# Patient Record
Sex: MALE | Race: WHITE | Employment: FULL TIME | URBAN - METROPOLITAN AREA
[De-identification: names, ages, dates, MRNs, and addresses within clinical notes are randomized per-mention and may not be internally consistent; named-entity substitution may affect disease eponyms.]

---

## 2021-04-25 ENCOUNTER — HOSPITAL ENCOUNTER (EMERGENCY)
Age: 46
Discharge: HOME | End: 2021-04-25
Attending: FAMILY MEDICINE
Payer: COMMERCIAL

## 2021-04-25 VITALS
WEIGHT: 215 LBS | HEART RATE: 74 BPM | RESPIRATION RATE: 16 BRPM | TEMPERATURE: 97.1 F | SYSTOLIC BLOOD PRESSURE: 119 MMHG | OXYGEN SATURATION: 98 % | DIASTOLIC BLOOD PRESSURE: 77 MMHG

## 2021-04-25 DIAGNOSIS — K02.9 PAIN DUE TO DENTAL CARIES: Primary | ICD-10-CM

## 2021-04-25 PROCEDURE — 99283 EMERGENCY DEPT VISIT LOW MDM: CPT

## 2021-04-25 RX ORDER — TRAMADOL HYDROCHLORIDE 50 MG/1
50 TABLET ORAL EVERY 8 HOURS PRN
Qty: 9 TABLET | Refills: 0 | Status: SHIPPED | OUTPATIENT
Start: 2021-04-25 | End: 2021-04-28

## 2021-04-25 RX ORDER — AMOXICILLIN 500 MG/1
500 CAPSULE ORAL 3 TIMES DAILY
Qty: 30 CAPSULE | Refills: 0 | Status: SHIPPED | OUTPATIENT
Start: 2021-04-25 | End: 2021-05-05

## 2021-04-25 RX ORDER — IBUPROFEN 800 MG/1
800 TABLET ORAL EVERY 6 HOURS PRN
Qty: 20 TABLET | Refills: 0 | Status: SHIPPED | OUTPATIENT
Start: 2021-04-25 | End: 2021-06-16

## 2021-04-25 RX ORDER — EMPAGLIFLOZIN 10 MG/1
10 TABLET, FILM COATED ORAL DAILY
COMMUNITY

## 2021-04-25 ASSESSMENT — PAIN DESCRIPTION - ORIENTATION: ORIENTATION: LEFT;UPPER

## 2021-04-25 ASSESSMENT — PAIN DESCRIPTION - LOCATION: LOCATION: JAW

## 2021-04-25 ASSESSMENT — PAIN DESCRIPTION - PAIN TYPE: TYPE: ACUTE PAIN

## 2021-04-25 ASSESSMENT — PAIN DESCRIPTION - PROGRESSION: CLINICAL_PROGRESSION: NOT CHANGED

## 2021-04-25 ASSESSMENT — PAIN DESCRIPTION - DESCRIPTORS: DESCRIPTORS: THROBBING

## 2021-04-25 ASSESSMENT — PAIN SCALES - GENERAL: PAINLEVEL_OUTOF10: 7

## 2021-04-25 NOTE — ED PROVIDER NOTES
HPI:  4/25/21,   Time: 2:28 PM EDT         Miguel Marquez is a 39 y.o. male presenting to the ED for a 2-day history of left upper dental pain and swelling in the left side of his face. The pain is an aching type pain of moderate intensity, worse with chewing. The pain is in the tooth has had problems with before. ROS:   Pertinent positives and negatives are stated within HPI, all other systems reviewed and are negative.  --------------------------------------------- PAST HISTORY ---------------------------------------------  Past Medical History:  has a past medical history of Diabetes mellitus ([facility] [address] Past Surgical History:  has a past surgical history that includes Appendectomy. Social History:  reports that he has quit smoking. He does not have any smokeless tobacco history on file. He reports previous alcohol use. He reports that he does not use drugs. Family History: family history is not on file. The patients home medications have been reviewed. Allergies: Patient has no known allergies. -------------------------------------------------- RESULTS -------------------------------------------------  All laboratory and radiology results have been personally reviewed by myself   LABS:  No results found for this visit on 04/25/21. RADIOLOGY:  Interpreted by Radiologist.  No orders to display       ------------------------- NURSING NOTES AND VITALS REVIEWED ---------------------------   The nursing notes within the ED encounter and vital signs as below have been reviewed.    /77   Pulse 74   Temp 97.1 °F (36.2 °C) (Temporal)   Resp 16   Wt 215 lb (97.5 kg)   SpO2 98%   Oxygen Saturation Interpretation: Normal      ---------------------------------------------------PHYSICAL EXAM--------------------------------------    Constitutional/General: Alert and oriented x3, well appearing, non toxic in NAD  Head: NC/AT  Eyes: PERRL, EOMI  Mouth: Oropharynx clear, handling secretions, no trismus; tooth #6 is broken and decayed down to the gumline. The gingiva appears normal.  Neck: Supple, full ROM, no meningeal signs  Pulmonary: Lungs clear to auscultation bilaterally, no wheezes, rales, or rhonchi. Not in respiratory distress  Cardiovascular:  Regular rate and rhythm, no murmurs, gallops, or rubs. 2+ distal pulses  Abdomen: Soft, non tender, non distended,   Extremities: Moves all extremities x 4. Warm and well perfused  Skin: warm and dry without rash  Neurologic: GCS 15,  Psych: Normal Affect      ------------------------------ ED COURSE/MEDICAL DECISION MAKING----------------------  Medications - No data to display      Medical Decision Making:    Simple    Counseling: The emergency provider has spoken with the patient and discussed todays results, in addition to providing specific details for the plan of care and counseling regarding the diagnosis and prognosis. Questions are answered at this time and they are agreeable with the plan.      --------------------------------- IMPRESSION AND DISPOSITION ---------------------------------    IMPRESSION  1.  Pain due to dental caries        DISPOSITION  Disposition: Discharge to home  Patient condition is stable                 Alex Mann MD  04/25/21 7102

## 2021-06-16 ENCOUNTER — HOSPITAL ENCOUNTER (EMERGENCY)
Age: 46
Discharge: HOME | End: 2021-06-16
Attending: EMERGENCY MEDICINE
Payer: COMMERCIAL

## 2021-06-16 VITALS
SYSTOLIC BLOOD PRESSURE: 140 MMHG | RESPIRATION RATE: 16 BRPM | DIASTOLIC BLOOD PRESSURE: 92 MMHG | OXYGEN SATURATION: 99 % | BODY MASS INDEX: 32.58 KG/M2 | HEART RATE: 81 BPM | WEIGHT: 220 LBS | TEMPERATURE: 97.1 F | HEIGHT: 69 IN

## 2021-06-16 DIAGNOSIS — K04.7 DENTAL INFECTION: Primary | ICD-10-CM

## 2021-06-16 PROCEDURE — 99282 EMERGENCY DEPT VISIT SF MDM: CPT

## 2021-06-16 RX ORDER — CLINDAMYCIN HYDROCHLORIDE 150 MG/1
150 CAPSULE ORAL 4 TIMES DAILY
Qty: 40 CAPSULE | Refills: 0 | Status: SHIPPED | OUTPATIENT
Start: 2021-06-16 | End: 2021-06-26

## 2021-06-16 RX ORDER — IBUPROFEN 600 MG/1
600 TABLET ORAL EVERY 8 HOURS PRN
Qty: 30 TABLET | Refills: 0 | Status: SHIPPED | OUTPATIENT
Start: 2021-06-16 | End: 2021-06-26

## 2021-06-16 ASSESSMENT — ENCOUNTER SYMPTOMS
EYE REDNESS: 0
SORE THROAT: 0
NAUSEA: 0
DIARRHEA: 0
BACK PAIN: 0
ABDOMINAL PAIN: 0
SHORTNESS OF BREATH: 0
COUGH: 0
SINUS PRESSURE: 0
VOMITING: 0
EYE DISCHARGE: 0
EYE PAIN: 0
WHEEZING: 0

## 2021-06-16 ASSESSMENT — PAIN SCALES - GENERAL: PAINLEVEL_OUTOF10: 7

## 2021-06-16 ASSESSMENT — PAIN DESCRIPTION - PAIN TYPE: TYPE: ACUTE PAIN

## 2021-06-16 ASSESSMENT — PAIN DESCRIPTION - ONSET: ONSET: SUDDEN

## 2021-06-16 ASSESSMENT — PAIN DESCRIPTION - FREQUENCY: FREQUENCY: CONTINUOUS

## 2021-06-16 ASSESSMENT — PAIN DESCRIPTION - LOCATION: LOCATION: TOE (COMMENT WHICH ONE)

## 2021-06-16 ASSESSMENT — PAIN DESCRIPTION - DESCRIPTORS: DESCRIPTORS: CONSTANT;THROBBING

## 2021-06-16 ASSESSMENT — PAIN DESCRIPTION - ORIENTATION: ORIENTATION: LEFT;UPPER

## 2021-06-16 ASSESSMENT — PAIN DESCRIPTION - PROGRESSION: CLINICAL_PROGRESSION: GRADUALLY WORSENING

## 2021-06-16 NOTE — ED PROVIDER NOTES
The history is provided by the patient. Dental Pain  Location:  Upper  Upper teeth location:  10/RADHA lateral incisor  Quality:  Aching  Severity:  Moderate  Onset quality:  Gradual  Duration:  1 week  Chronicity:  Recurrent  Context: abscess and dental caries    Associated symptoms: no fever and no headaches         Review of Systems   Constitutional: Negative for chills and fever. HENT: Negative for ear pain, sinus pressure and sore throat. Eyes: Negative for pain, discharge and redness. Respiratory: Negative for cough, shortness of breath and wheezing. Cardiovascular: Negative for chest pain. Gastrointestinal: Negative for abdominal pain, diarrhea, nausea and vomiting. Genitourinary: Negative for dysuria and frequency. Musculoskeletal: Negative for arthralgias and back pain. Skin: Negative for rash and wound. Neurological: Negative for weakness and headaches. Hematological: Negative for adenopathy. All other systems reviewed and are negative. Physical Exam  Vitals and nursing note reviewed. Constitutional:       Appearance: He is well-developed. HENT:      Head: Normocephalic and atraumatic. Jaw: No trismus. Right Ear: Hearing, tympanic membrane and external ear normal.      Left Ear: Hearing, tympanic membrane and external ear normal.      Nose: Nose normal.      Right Sinus: No maxillary sinus tenderness or frontal sinus tenderness. Left Sinus: No maxillary sinus tenderness or frontal sinus tenderness. Mouth/Throat:      Dentition: Gingival swelling and dental caries present. Pharynx: Uvula midline. No uvula swelling. Eyes:      General: Lids are normal.      Conjunctiva/sclera: Conjunctivae normal.      Pupils: Pupils are equal, round, and reactive to light. Cardiovascular:      Rate and Rhythm: Normal rate and regular rhythm. Heart sounds: Normal heart sounds. No murmur heard.      Pulmonary:      Effort: Pulmonary effort is normal. No respiratory distress. Breath sounds: Normal breath sounds. No wheezing or rales. Abdominal:      General: Bowel sounds are normal.      Palpations: Abdomen is soft. Abdomen is not rigid. Tenderness: There is no abdominal tenderness. There is no guarding or rebound. Musculoskeletal:      Cervical back: Normal range of motion and neck supple. Skin:     General: Skin is warm and dry. Findings: No abrasion or rash. Neurological:      Mental Status: He is alert and oriented to person, place, and time. GCS: GCS eye subscore is 4. GCS verbal subscore is 5. GCS motor subscore is 6. Cranial Nerves: No cranial nerve deficit. Sensory: No sensory deficit. Coordination: Coordination normal.      Gait: Gait normal.          Procedures     MDM          --------------------------------------------- PAST HISTORY ---------------------------------------------  Past Medical History:  has a past medical history of Diabetes mellitus ([facility] [address] Past Surgical History:  has a past surgical history that includes Appendectomy. Social History:  reports that he has quit smoking. He has never used smokeless tobacco. He reports previous alcohol use. He reports that he does not use drugs. Family History: family history is not on file. The patients home medications have been reviewed. Allergies: Patient has no known allergies. -------------------------------------------------- RESULTS -------------------------------------------------  Labs:  No results found for this visit on 06/16/21. Radiology:  No orders to display       ------------------------- NURSING NOTES AND VITALS REVIEWED ---------------------------  Date / Time Roomed:  6/16/2021  6:09 PM  ED Bed Assignment:  03/03    The nursing notes within the ED encounter and vital signs as below have been reviewed.    BP (!) 140/92   Pulse 81   Temp 97.1 °F (36.2 °C) (Temporal)   Resp 16   Ht 5' 9\" (1.753 m)   Wt 220 lb (99.8 kg) SpO2 99%   BMI 32.49 kg/m²   Oxygen Saturation Interpretation: Normal      ------------------------------------------ PROGRESS NOTES ------------------------------------------  I have spoken with the patient and discussed todays results, in addition to providing specific details for the plan of care and counseling regarding the diagnosis and prognosis. Their questions are answered at this time and they are agreeable with the plan. I discussed at length with them reasons for immediate return here for re evaluation. They will followup with primary care by calling their office tomorrow. --------------------------------- ADDITIONAL PROVIDER NOTES ---------------------------------  At this time the patient is without objective evidence of an acute process requiring hospitalization or inpatient management. They have remained hemodynamically stable throughout their entire ED visit and are stable for discharge with outpatient follow-up. The plan has been discussed in detail and they are aware of the specific conditions for emergent return, as well as the importance of follow-up. New Prescriptions    CLINDAMYCIN (CLEOCIN) 150 MG CAPSULE    Take 1 capsule by mouth 4 times daily for 10 days    IBUPROFEN (IBU) 600 MG TABLET    Take 1 tablet by mouth every 8 hours as needed for Pain       Diagnosis:  1. Dental infection        Disposition:  Patient's disposition: Discharge to home  Patient's condition is stable.                       Luca Lerner MD  06/16/21 4414

## 2022-11-30 ENCOUNTER — HOSPITAL ENCOUNTER (EMERGENCY)
Age: 47
Discharge: HOME | End: 2022-11-30

## 2022-11-30 ENCOUNTER — APPOINTMENT (OUTPATIENT)
Dept: CT IMAGING | Age: 47
End: 2022-11-30

## 2022-11-30 VITALS
SYSTOLIC BLOOD PRESSURE: 140 MMHG | HEART RATE: 80 BPM | DIASTOLIC BLOOD PRESSURE: 95 MMHG | OXYGEN SATURATION: 100 % | RESPIRATION RATE: 20 BRPM | TEMPERATURE: 98.2 F

## 2022-11-30 DIAGNOSIS — K57.32 DIVERTICULITIS OF COLON: Primary | ICD-10-CM

## 2022-11-30 LAB
ALBUMIN SERPL-MCNC: 4.2 G/DL (ref 3.5–5.2)
ALP BLD-CCNC: 96 U/L (ref 40–129)
ALT SERPL-CCNC: 32 U/L (ref 0–40)
ANION GAP SERPL CALCULATED.3IONS-SCNC: 10 MMOL/L (ref 7–16)
AST SERPL-CCNC: 18 U/L (ref 0–39)
BASOPHILS ABSOLUTE: 0.08 E9/L (ref 0–0.2)
BASOPHILS RELATIVE PERCENT: 0.6 % (ref 0–2)
BILIRUB SERPL-MCNC: 0.9 MG/DL (ref 0–1.2)
BUN BLDV-MCNC: 12 MG/DL (ref 6–20)
CALCIUM SERPL-MCNC: 9.3 MG/DL (ref 8.6–10.2)
CHLORIDE BLD-SCNC: 95 MMOL/L (ref 98–107)
CO2: 29 MMOL/L (ref 22–29)
CREAT SERPL-MCNC: 1 MG/DL (ref 0.7–1.2)
EOSINOPHILS ABSOLUTE: 0.47 E9/L (ref 0.05–0.5)
EOSINOPHILS RELATIVE PERCENT: 3.5 % (ref 0–6)
GFR SERPL CREATININE-BSD FRML MDRD: >60 ML/MIN/1.73
GLUCOSE BLD-MCNC: 225 MG/DL (ref 74–99)
HCT VFR BLD CALC: 43.6 % (ref 37–54)
HEMOGLOBIN: 14.9 G/DL (ref 12.5–16.5)
IMMATURE GRANULOCYTES #: 0.15 E9/L
IMMATURE GRANULOCYTES %: 1.1 % (ref 0–5)
LACTIC ACID: 1.4 MMOL/L (ref 0.5–2.2)
LYMPHOCYTES ABSOLUTE: 2.41 E9/L (ref 1.5–4)
LYMPHOCYTES RELATIVE PERCENT: 17.7 % (ref 20–42)
MCH RBC QN AUTO: 30.7 PG (ref 26–35)
MCHC RBC AUTO-ENTMCNC: 34.2 % (ref 32–34.5)
MCV RBC AUTO: 89.7 FL (ref 80–99.9)
MONOCYTES ABSOLUTE: 1.29 E9/L (ref 0.1–0.95)
MONOCYTES RELATIVE PERCENT: 9.5 % (ref 2–12)
NEUTROPHILS ABSOLUTE: 9.21 E9/L (ref 1.8–7.3)
NEUTROPHILS RELATIVE PERCENT: 67.6 % (ref 43–80)
PDW BLD-RTO: 12.3 FL (ref 11.5–15)
PLATELET # BLD: 250 E9/L (ref 130–450)
PMV BLD AUTO: 10.8 FL (ref 7–12)
POTASSIUM REFLEX MAGNESIUM: 4.4 MMOL/L (ref 3.5–5)
RBC # BLD: 4.86 E12/L (ref 3.8–5.8)
SODIUM BLD-SCNC: 134 MMOL/L (ref 132–146)
TOTAL PROTEIN: 6.9 G/DL (ref 6.4–8.3)
WBC # BLD: 13.6 E9/L (ref 4.5–11.5)

## 2022-11-30 PROCEDURE — 80053 COMPREHEN METABOLIC PANEL: CPT

## 2022-11-30 PROCEDURE — 6360000004 HC RX CONTRAST MEDICATION: Performed by: RADIOLOGY

## 2022-11-30 PROCEDURE — 96374 THER/PROPH/DIAG INJ IV PUSH: CPT

## 2022-11-30 PROCEDURE — 83605 ASSAY OF LACTIC ACID: CPT

## 2022-11-30 PROCEDURE — 6360000002 HC RX W HCPCS: Performed by: PHYSICIAN ASSISTANT

## 2022-11-30 PROCEDURE — 96375 TX/PRO/DX INJ NEW DRUG ADDON: CPT

## 2022-11-30 PROCEDURE — 6370000000 HC RX 637 (ALT 250 FOR IP): Performed by: PHYSICIAN ASSISTANT

## 2022-11-30 PROCEDURE — 85025 COMPLETE CBC W/AUTO DIFF WBC: CPT

## 2022-11-30 PROCEDURE — 74177 CT ABD & PELVIS W/CONTRAST: CPT

## 2022-11-30 PROCEDURE — 99285 EMERGENCY DEPT VISIT HI MDM: CPT

## 2022-11-30 RX ORDER — METRONIDAZOLE 500 MG/1
500 TABLET ORAL 4 TIMES DAILY
Qty: 40 TABLET | Refills: 0 | Status: SHIPPED | OUTPATIENT
Start: 2022-11-30 | End: 2022-12-10

## 2022-11-30 RX ORDER — HYDROCODONE BITARTRATE AND ACETAMINOPHEN 5; 325 MG/1; MG/1
1 TABLET ORAL EVERY 6 HOURS PRN
Qty: 12 TABLET | Refills: 0 | Status: SHIPPED | OUTPATIENT
Start: 2022-11-30 | End: 2022-12-03

## 2022-11-30 RX ORDER — METRONIDAZOLE 500 MG/1
500 TABLET ORAL ONCE
Status: COMPLETED | OUTPATIENT
Start: 2022-11-30 | End: 2022-11-30

## 2022-11-30 RX ORDER — CEFDINIR 300 MG/1
300 CAPSULE ORAL 2 TIMES DAILY
Qty: 20 CAPSULE | Refills: 0 | Status: SHIPPED | OUTPATIENT
Start: 2022-11-30 | End: 2022-12-10

## 2022-11-30 RX ORDER — CEFDINIR 300 MG/1
300 CAPSULE ORAL ONCE
Status: COMPLETED | OUTPATIENT
Start: 2022-11-30 | End: 2022-11-30

## 2022-11-30 RX ORDER — ONDANSETRON 2 MG/ML
4 INJECTION INTRAMUSCULAR; INTRAVENOUS ONCE
Status: COMPLETED | OUTPATIENT
Start: 2022-11-30 | End: 2022-11-30

## 2022-11-30 RX ORDER — KETOROLAC TROMETHAMINE 30 MG/ML
30 INJECTION, SOLUTION INTRAMUSCULAR; INTRAVENOUS ONCE
Status: COMPLETED | OUTPATIENT
Start: 2022-11-30 | End: 2022-11-30

## 2022-11-30 RX ADMIN — METRONIDAZOLE 500 MG: 500 TABLET ORAL at 15:40

## 2022-11-30 RX ADMIN — KETOROLAC TROMETHAMINE 30 MG: 30 INJECTION, SOLUTION INTRAMUSCULAR at 12:44

## 2022-11-30 RX ADMIN — ONDANSETRON 4 MG: 2 INJECTION INTRAMUSCULAR; INTRAVENOUS at 12:44

## 2022-11-30 RX ADMIN — CEFDINIR 300 MG: 300 CAPSULE ORAL at 15:40

## 2022-11-30 RX ADMIN — IOPAMIDOL 75 ML: 755 INJECTION, SOLUTION INTRAVENOUS at 14:17

## 2022-11-30 ASSESSMENT — PAIN SCALES - GENERAL
PAINLEVEL_OUTOF10: 4
PAINLEVEL_OUTOF10: 10

## 2022-11-30 ASSESSMENT — PAIN DESCRIPTION - DESCRIPTORS: DESCRIPTORS: ACHING

## 2022-11-30 ASSESSMENT — PAIN DESCRIPTION - ORIENTATION: ORIENTATION: LEFT

## 2022-11-30 ASSESSMENT — PAIN DESCRIPTION - LOCATION: LOCATION: ABDOMEN

## 2022-11-30 ASSESSMENT — PAIN - FUNCTIONAL ASSESSMENT: PAIN_FUNCTIONAL_ASSESSMENT: 0-10

## 2022-11-30 NOTE — ED PROVIDER NOTES
[facility]      HPI:  11/30/22, Time: 12:15 PM GLENDA Odonnell is a 55 y.o. male presenting to the ED for left lower quadrant pain, beginning 1 day ago. The complaint has been persistent, moderate in severity, and worsened by nothing. Patient comes in with complaint of left lower quadrant pain that started yesterday. Pain is sharp crampy pain. Does not radiate to the back. He denies any nausea vomiting diarrhea or constipation and he had some discomfort when urinating. Denies any known fever states he has had chills. No history of kidney stone or diverticulitis in the past.    Review of Systems:   A complete review of systems was performed and pertinent positives and negatives are stated within HPI, all other systems reviewed and are negative.          --------------------------------------------- PAST HISTORY ---------------------------------------------  Past Medical History:  has a past medical history of Diabetes mellitus ([facility] [address] Past Surgical History:  has a past surgical history that includes Appendectomy. Social History:  reports that he has quit smoking. He has never used smokeless tobacco. He reports that he does not currently use alcohol. He reports that he does not use drugs. Family History: family history is not on file. The patients home medications have been reviewed. Allergies: Patient has no known allergies.     -------------------------------------------------- RESULTS -------------------------------------------------  All laboratory and radiology results have been personally reviewed by myself   LABS:  Results for orders placed or performed during the hospital encounter of 11/30/22   CBC with Auto Differential   Result Value Ref Range    WBC 13.6 (H) 4.5 - 11.5 E9/L    RBC 4.86 3.80 - 5.80 E12/L    Hemoglobin 14.9 12.5 - 16.5 g/dL    Hematocrit 43.6 37.0 - 54.0 %    MCV 89.7 80.0 - 99.9 fL    MCH 30.7 26.0 - 35.0 pg    MCHC 34.2 32.0 - 34.5 %    RDW 12.3 11.5 - 15.0 fL    Platelets 602 676 - 510 E9/L    MPV 10.8 7.0 - 12.0 fL    Neutrophils % 67.6 43.0 - 80.0 %    Immature Granulocytes % 1.1 0.0 - 5.0 %    Lymphocytes % 17.7 (L) 20.0 - 42.0 %    Monocytes % 9.5 2.0 - 12.0 %    Eosinophils % 3.5 0.0 - 6.0 %    Basophils % 0.6 0.0 - 2.0 %    Neutrophils Absolute 9.21 (H) 1.80 - 7.30 E9/L    Immature Granulocytes # 0.15 E9/L    Lymphocytes Absolute 2.41 1.50 - 4.00 E9/L    Monocytes Absolute 1.29 (H) 0.10 - 0.95 E9/L    Eosinophils Absolute 0.47 0.05 - 0.50 E9/L    Basophils Absolute 0.08 0.00 - 0.20 E9/L   Comprehensive Metabolic Panel w/ Reflex to MG   Result Value Ref Range    Sodium 134 132 - 146 mmol/L    Potassium reflex Magnesium 4.4 3.5 - 5.0 mmol/L    Chloride 95 (L) 98 - 107 mmol/L    CO2 29 22 - 29 mmol/L    Anion Gap 10 7 - 16 mmol/L    Glucose 225 (H) 74 - 99 mg/dL    BUN 12 6 - 20 mg/dL    Creatinine 1.0 0.7 - 1.2 mg/dL    Est, Glom Filt Rate >60 >=60 mL/min/1.73    Calcium 9.3 8.6 - 10.2 mg/dL    Total Protein 6.9 6.4 - 8.3 g/dL    Albumin 4.2 3.5 - 5.2 g/dL    Total Bilirubin 0.9 0.0 - 1.2 mg/dL    Alkaline Phosphatase 96 40 - 129 U/L    ALT 32 0 - 40 U/L    AST 18 0 - 39 U/L   Lactic Acid   Result Value Ref Range    Lactic Acid 1.4 0.5 - 2.2 mmol/L       RADIOLOGY:  Interpreted by Radiologist.  CT ABDOMEN PELVIS W IV CONTRAST Additional Contrast? None   Final Result   1. ACUTE UNCOMPLICATED DIVERTICULITIS involving the proximal sigmoid colon. 2. Severe distal colonic diverticulosis. 3. Hepatomegaly with steatosis.             ------------------------- NURSING NOTES AND VITALS REVIEWED ---------------------------   The nursing notes within the ED encounter and vital signs as below have been reviewed.    BP (!) 140/95   Pulse 80   Temp 98.2 °F (36.8 °C)   Resp 20   SpO2 100%   Oxygen Saturation Interpretation: Normal      ---------------------------------------------------PHYSICAL EXAM--------------------------------------      Constitutional/General: Alert and oriented x3, well appearing, non toxic in NAD  Head: Normocephalic and atraumatic  Eyes: PERRL, EOMI  Mouth: Oropharynx clear, handling secretions, no trismus  Neck: Supple, full ROM,   Pulmonary: Lungs clear to auscultation bilaterally, no wheezes, rales, or rhonchi. Not in respiratory distress  Cardiovascular:  Regular rate and rhythm, no murmurs, gallops, or rubs. 2+ distal pulses  Abdomen: Soft, tender left lower quadrant non distended, no CVA tenderness  Extremities: Moves all extremities x 4. Warm and well perfused  Skin: warm and dry without rash  Neurologic: GCS 15,  Psych: Normal Affect      ------------------------------ ED COURSE/MEDICAL DECISION MAKING----------------------  Medications   ketorolac (TORADOL) injection 30 mg (30 mg IntraVENous Given 11/30/22 1244)   ondansetron (ZOFRAN) injection 4 mg (4 mg IntraVENous Given 11/30/22 1244)   iopamidol (ISOVUE-370) 76 % injection 75 mL (75 mLs IntraVENous Given 11/30/22 1417)   cefdinir (OMNICEF) capsule 300 mg (300 mg Oral Given 11/30/22 1540)   metroNIDAZOLE (FLAGYL) tablet 500 mg (500 mg Oral Given 11/30/22 1540)         ED COURSE:   1425 patient states his pain is controlled at this time he was updated on labs CT is pending    Medical Decision Making:    Patient Came in with complaint of left lower quadrant pain that started yesterday. CT showing a acute uncomplicated diverticulitis patient had control of pain with Toradol here in ER he was placed on Omnicef Flagyl and Norco.  He understands he is to return to the ER if he has any worsening symptoms development of fever persistent uncontrolled pain. Counseling: The emergency provider has spoken with the patient and discussed todays results, in addition to providing specific details for the plan of care and counseling regarding the diagnosis and prognosis.   Questions are answered at this time and they are agreeable with the plan.      --------------------------------- IMPRESSION AND DISPOSITION ---------------------------------    IMPRESSION  1. Diverticulitis of colon        DISPOSITION  Disposition: Discharge to home  Patient condition is good      NOTE: This report was transcribed using voice recognition software.  Every effort was made to ensure accuracy; however, inadvertent computerized transcription errors may be present      Minus Joyce, [id number]  11/30/22 4900

## 2022-11-30 NOTE — Clinical Note
Wolfgang Anaya was seen and treated in our emergency department on 11/30/2022. He may return to work on 12/05/2022. If you have any questions or concerns, please don't hesitate to call.       ADDY Caban

## 2023-04-11 ENCOUNTER — HOSPITAL ENCOUNTER (EMERGENCY)
Age: 48
Discharge: HOME | End: 2023-04-11
Attending: FAMILY MEDICINE
Payer: COMMERCIAL

## 2023-04-11 VITALS
HEIGHT: 69 IN | DIASTOLIC BLOOD PRESSURE: 98 MMHG | HEART RATE: 68 BPM | SYSTOLIC BLOOD PRESSURE: 132 MMHG | TEMPERATURE: 97.3 F | BODY MASS INDEX: 32.58 KG/M2 | WEIGHT: 220 LBS | OXYGEN SATURATION: 99 % | RESPIRATION RATE: 16 BRPM

## 2023-04-11 DIAGNOSIS — K02.9 PAIN DUE TO DENTAL CARIES: Primary | ICD-10-CM

## 2023-04-11 PROCEDURE — 99283 EMERGENCY DEPT VISIT LOW MDM: CPT

## 2023-04-11 RX ORDER — IBUPROFEN 800 MG/1
800 TABLET ORAL EVERY 8 HOURS PRN
Qty: 20 TABLET | Refills: 0 | Status: SHIPPED | OUTPATIENT
Start: 2023-04-11

## 2023-04-11 RX ORDER — AMOXICILLIN 500 MG/1
500 CAPSULE ORAL 3 TIMES DAILY
Qty: 30 CAPSULE | Refills: 0 | Status: SHIPPED | OUTPATIENT
Start: 2023-04-11 | End: 2023-04-21

## 2023-04-11 ASSESSMENT — PAIN DESCRIPTION - LOCATION: LOCATION: JAW;EAR

## 2023-04-11 ASSESSMENT — PAIN DESCRIPTION - PAIN TYPE: TYPE: ACUTE PAIN

## 2023-04-11 ASSESSMENT — PAIN DESCRIPTION - ORIENTATION: ORIENTATION: LEFT;LOWER

## 2023-04-11 ASSESSMENT — PAIN DESCRIPTION - FREQUENCY: FREQUENCY: CONTINUOUS

## 2023-04-11 ASSESSMENT — PAIN DESCRIPTION - DESCRIPTORS: DESCRIPTORS: THROBBING

## 2023-04-11 ASSESSMENT — PAIN SCALES - GENERAL: PAINLEVEL_OUTOF10: 6

## 2023-04-11 ASSESSMENT — PAIN - FUNCTIONAL ASSESSMENT: PAIN_FUNCTIONAL_ASSESSMENT: 0-10

## 2023-04-11 NOTE — ED PROVIDER NOTES
HPI:  4/11/23,   Time: 2:25 PM EDT         Kaylin Bishop is a 52 y.o. male presenting to the ED for 1 day history of left lower jaw pain, worse with chewing, also associated with some sharp left ear pain that is intermittent. He denies nasal congestion or nasal discharge. He denies fever chills or body aches. ROS:   Pertinent positives and negatives are stated within HPI, all other systems reviewed and are negative.  --------------------------------------------- PAST HISTORY ---------------------------------------------  Past Medical History:  has a past medical history of Diabetes mellitus ([facility] [address] Past Surgical History:  has a past surgical history that includes Appendectomy. Social History:  reports that he has quit smoking. He has never used smokeless tobacco. He reports that he does not currently use alcohol. He reports that he does not use drugs. Family History: family history is not on file. The patients home medications have been reviewed. Allergies: Patient has no known allergies. -------------------------------------------------- RESULTS -------------------------------------------------  All laboratory and radiology results have been personally reviewed by myself   LABS:  No results found for this visit on 04/11/23. RADIOLOGY:  Interpreted by Radiologist.  No orders to display       ------------------------- NURSING NOTES AND VITALS REVIEWED ---------------------------   The nursing notes within the ED encounter and vital signs as below have been reviewed.    BP (!) 132/98   Pulse 68   Temp 97.3 °F (36.3 °C) (Temporal)   Resp 16   Ht 5' 9\" (1.753 m)   Wt 220 lb (99.8 kg)   SpO2 99%   BMI 32.49 kg/m²   Oxygen Saturation Interpretation: Normal      ---------------------------------------------------PHYSICAL EXAM--------------------------------------    Constitutional/General: Alert and oriented x3, well appearing, non toxic in NAD  Head: NC/AT  Eyes: PERRL,

## 2023-08-07 ENCOUNTER — EVALUATION (OUTPATIENT)
Dept: OCCUPATIONAL THERAPY | Age: 48
End: 2023-08-07
Payer: COMMERCIAL

## 2023-08-07 DIAGNOSIS — M20.029 BOUTONNIERE DEFORMITY, UNSPECIFIED LATERALITY: Primary | ICD-10-CM

## 2023-08-07 PROCEDURE — 97165 OT EVAL LOW COMPLEX 30 MIN: CPT | Performed by: OCCUPATIONAL THERAPIST

## 2023-08-07 PROCEDURE — 97530 THERAPEUTIC ACTIVITIES: CPT | Performed by: OCCUPATIONAL THERAPIST

## 2023-08-07 PROCEDURE — 97760 ORTHOTIC MGMT&TRAING 1ST ENC: CPT | Performed by: OCCUPATIONAL THERAPIST

## 2023-08-07 NOTE — PROGRESS NOTES
difficulty bending the tip of the finger. Pt presents today for OT evaluation and treatment. Home Living: Pt lives at home with his wife and kids. Prior Level of Function: Independent    Cognition:   Alert/Oriented x3    IADL STATUS: Increased soreness with extended or more resistive use of the hand but otherwise IND in all tasks. Pulling clutch of his motorcycle can increase pain in the digit. 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 Reports no difficulty. ADL STATUS: IND in all with no complaints.       Pain Level: Typically at rest or with light use - digit is 0-1/10 pain, can range between 2-5/10 pain in the PIP of the MF with proglonged grasping of flexion activities - of with max extension stretch of the PIP;  aching, dull, tight (pulling), and uncomfortable    UE Assessment:   Pt will benefit from continued skilled OT services to further address pain/edema management, boutonniere deformity/MF ROM, and activity tolerance with functional, resistive use of the L UE s/p injury.     -Pt presenting with clear boutonniere deformity of the MF with some bruising laterally  -Full fist with full opposition observed (mild difficulty to MF - unable to flex DIP)  -Stiffness in PIP and DIP observed in MF when positioned into hook fist  -PIP of MF can be extended near full passively w/ moderate pain reported  -Mild tenderness to palpation laterally of the PIP joint  -Pt able to flex DIP while blocked and in composite fist  -Good prehension skills observed with slight to mild difficulty with in-hand manipulation tasks  -Fair activity tolerance observed with moderate to heavy resistive tasks (reports pain anywhere from 2-5/10, the longer the activity results in increased pain)       Left Upper Extremity ROM /  KEY: Ext/Flex   AROM(PROM)              IE  3rd Digit MCP E/F: 0/90*-100* 79*      PIP E/F: 0/100* -22*/100*      DIP E/F: 0/70*-90* 15* HE/37*       Comment: Hand Dominance is

## 2023-08-10 ENCOUNTER — TREATMENT (OUTPATIENT)
Dept: OCCUPATIONAL THERAPY | Age: 48
End: 2023-08-10
Payer: COMMERCIAL

## 2023-08-10 DIAGNOSIS — M20.029 BOUTONNIERE DEFORMITY, UNSPECIFIED LATERALITY: Primary | ICD-10-CM

## 2023-08-10 PROCEDURE — 97110 THERAPEUTIC EXERCISES: CPT | Performed by: OCCUPATIONAL THERAPIST

## 2023-08-10 PROCEDURE — 97140 MANUAL THERAPY 1/> REGIONS: CPT | Performed by: OCCUPATIONAL THERAPIST

## 2023-08-10 PROCEDURE — 97530 THERAPEUTIC ACTIVITIES: CPT | Performed by: OCCUPATIONAL THERAPIST

## 2023-08-10 PROCEDURE — 97018 PARAFFIN BATH THERAPY: CPT | Performed by: OCCUPATIONAL THERAPIST

## 2023-08-10 NOTE — PROGRESS NOTES
OCCUPATIONAL THERAPY DAILY NOTE  [facility] THERAPY   [place] [address] [address]  Dept: [phone]  Loc: [phone]   Janice Choudhury OT Fax: [phone]      Date:  8/10/2023  Initial Evaluation Date: 23     Evaluating Therapist: Clotilde Plascencia OT    Patient Name:  Jim Barrientos. 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 Eboni Natarajan DO  Specific Practitioner Orders: LMB splinting of the MF until full extension is obtained, then figure-of-eight splinting. 6-8 weeks w/ ROM of the DIP joint. PROM, AAROM, AROM, stretching, strengthening, and modalities as needed. Splint needed: LMB then figure 8. Assessment of current deficits   [x] ROM           [x] Pain            [x] Activity tolerance   [x] Edema             OT PLAN OF CARE   OT POC based on physician orders, patient diagnosis and results of clinical assessment.      Frequency/Duration: 1-2x/wk for 12 visits  /  Certification Period From: 2023  To: 2023     Specific OT Treatment to include:   [x] Instruction in HEP                   Modalities:  [x] Therapeutic Exercise                 [x] Ultrasound             [x] PROM/Stretching                    [x] Fluidotherapy          [x]  Paraffin                   [x] AAROM  [x] AROM                 [x] Iontophoresis:   [x] Tendon Glides                         [x] Electrical Stimulation/Attended                    [x] ADL/IADL re-training                 [x] Desensitization/Nerve Stimulation     [x] Therapeutic Activity                  [x] Pain Management with/without modalities PRN  [x] Manual Therapy

## 2023-08-14 ENCOUNTER — TREATMENT (OUTPATIENT)
Dept: OCCUPATIONAL THERAPY | Age: 48
End: 2023-08-14
Payer: COMMERCIAL

## 2023-08-14 DIAGNOSIS — M20.029 BOUTONNIERE DEFORMITY, UNSPECIFIED LATERALITY: Primary | ICD-10-CM

## 2023-08-14 PROCEDURE — 97018 PARAFFIN BATH THERAPY: CPT | Performed by: OCCUPATIONAL THERAPIST

## 2023-08-14 PROCEDURE — 97140 MANUAL THERAPY 1/> REGIONS: CPT | Performed by: OCCUPATIONAL THERAPIST

## 2023-08-14 PROCEDURE — 97110 THERAPEUTIC EXERCISES: CPT | Performed by: OCCUPATIONAL THERAPIST

## 2023-08-14 PROCEDURE — 97530 THERAPEUTIC ACTIVITIES: CPT | Performed by: OCCUPATIONAL THERAPIST

## 2023-08-14 NOTE — PROGRESS NOTES
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 Jacquelin Wilkins DO  Specific Practitioner Orders: LMB splinting of the MF until full extension is obtained, then figure-of-eight splinting. 6-8 weeks w/ ROM of the DIP joint. PROM, AAROM, AROM, stretching, strengthening, and modalities as needed. Splint needed: LMB then figure 8. Assessment of current deficits   [x] ROM           [x] Pain            [x] Activity tolerance   [x] Edema             OT PLAN OF CARE   OT POC based on physician orders, patient diagnosis and results of clinical assessment.      Frequency/Duration: 1-2x/wk for 12 visits  /  Certification Period From: 2023  To: 2023     Specific OT Treatment to include:   [x] Instruction in HEP                   Modalities:  [x] Therapeutic Exercise                 [x] Ultrasound             [x] PROM/Stretching                    [x] Fluidotherapy          [x]  Paraffin                   [x] AAROM  [x] AROM                 [x] Iontophoresis:   [x] Tendon Glides                         [x] Electrical Stimulation/Attended                    [x] ADL/IADL re-training                 [x] Desensitization/Nerve Stimulation     [x] Therapeutic Activity                  [x] Pain Management with/without modalities PRN  [x] Manual

## 2023-08-17 ENCOUNTER — TREATMENT (OUTPATIENT)
Dept: OCCUPATIONAL THERAPY | Age: 48
End: 2023-08-17

## 2023-08-17 DIAGNOSIS — M20.029 BOUTONNIERE DEFORMITY, UNSPECIFIED LATERALITY: Primary | ICD-10-CM

## 2023-08-17 NOTE — PROGRESS NOTES
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 Ceasar Kent DO  Specific Practitioner Orders: LMB splinting of the MF until full extension is obtained, then figure-of-eight splinting. 6-8 weeks w/ ROM of the DIP joint. PROM, AAROM, AROM, stretching, strengthening, and modalities as needed. Splint needed: LMB then figure 8. Assessment of current deficits   [x] ROM           [x] Pain            [x] Activity tolerance   [x] Edema             OT PLAN OF CARE   OT POC based on physician orders, patient diagnosis and results of clinical assessment.      Frequency/Duration: 1-2x/wk for 12 visits  /  Certification Period From: 2023  To: 2023     Specific OT Treatment to include:   [x] Instruction in HEP                   Modalities:  [x] Therapeutic Exercise                 [x] Ultrasound             [x] PROM/Stretching                    [x] Fluidotherapy          [x]  Paraffin                   [x] AAROM  [x] AROM                 [x] Iontophoresis:   [x] Tendon Glides                         [x] Electrical Stimulation/Attended                    [x] ADL/IADL re-training                 [x] Desensitization/Nerve Stimulation     [x] Therapeutic Activity                  [x] Pain Management with/without modalities PRN  [x] Manual

## 2023-08-21 ENCOUNTER — TREATMENT (OUTPATIENT)
Dept: OCCUPATIONAL THERAPY | Age: 48
End: 2023-08-21
Payer: COMMERCIAL

## 2023-08-21 DIAGNOSIS — M20.029 BOUTONNIERE DEFORMITY, UNSPECIFIED LATERALITY: Primary | ICD-10-CM

## 2023-08-21 PROCEDURE — 97140 MANUAL THERAPY 1/> REGIONS: CPT | Performed by: OCCUPATIONAL THERAPY ASSISTANT

## 2023-08-21 PROCEDURE — 97110 THERAPEUTIC EXERCISES: CPT | Performed by: OCCUPATIONAL THERAPY ASSISTANT

## 2023-08-21 PROCEDURE — 97530 THERAPEUTIC ACTIVITIES: CPT | Performed by: OCCUPATIONAL THERAPY ASSISTANT

## 2023-08-21 PROCEDURE — 97018 PARAFFIN BATH THERAPY: CPT | Performed by: OCCUPATIONAL THERAPY ASSISTANT

## 2023-08-21 NOTE — PROGRESS NOTES
Therapy                      [x] Splinting                                   [x] Scar Management                   [x] Joint Mobilization                                        [x] Manual Edema Mobilization    [x] Myofascial Release                   [x] GM/FM Coordination                [x] Safety retraining/education per individual diagnosis/goals                                      Patient Specific Goal: To position finger back to normal extension                             GOALS (Long term same as Short term):  1) Patient will demonstrate good understanding of home program (exercises/activities/diagnosis/prognosis/goals) with % compliance. 2) Patient will demonstrate increased active/passive range of motion of their PIP ext to -18* or less. 3) Patient will demonstrate increased active/passive range of motion of their DIP to 5* or less HE and 50* or more of flexion to reduce digital deformity. 4) Patient to report decreased pain in their affected L upper extremity from 2-5/10 to 2/10 or less with resistive functional use. 5) Patient to report 100% compliance with their splint wear, care, and precautions if needed. 6) Patient will demonstrate improved functional activity tolerance from fair to good for IADL completion and riding motorcycle. TODAY'S TREATMENT     Pain Level: 1/10 when patient arrived and increases up to 3/10 soreness with resistive use; 2-3/10 walking into today's session. Subjective: Pt stated \"When I took my splint off this morning. I couldn't believe how flat my finger was\"      Objective:  Updated POC to be completed by visit 6.   INTERVENTION: COMPLETED: SPECIFICS/COMMENTS:   Modality:     Paraffin Tx x - 10 min to decrease joint stiffness and promote mobility to soft tissues        AROM/AAROM:     Hand ROM ex X  X    X  X  X  X  X  X  X  X  x - isolated blocking  PIP (1 set) , DIP flex/ ext- 2x15 ea  - Fingertip curls around brown dowel -> advanced to pencil x

## 2023-08-25 ENCOUNTER — TREATMENT (OUTPATIENT)
Dept: OCCUPATIONAL THERAPY | Age: 48
End: 2023-08-25

## 2023-08-25 DIAGNOSIS — M20.029 BOUTONNIERE DEFORMITY, UNSPECIFIED LATERALITY: Primary | ICD-10-CM

## 2023-08-25 NOTE — PROGRESS NOTES
Therapy                      [x] Splinting                                   [x] Scar Management                   [x] Joint Mobilization                                        [x] Manual Edema Mobilization    [x] Myofascial Release                   [x] GM/FM Coordination                [x] Safety retraining/education per individual diagnosis/goals                                      Patient Specific Goal: To position finger back to normal extension                             GOALS (Long term same as Short term):  1) Patient will demonstrate good understanding of home program (exercises/activities/diagnosis/prognosis/goals) with % compliance. 2) Patient will demonstrate increased active/passive range of motion of their PIP ext to -18* or less. 3) Patient will demonstrate increased active/passive range of motion of their DIP to 5* or less HE and 50* or more of flexion to reduce digital deformity. 4) Patient to report decreased pain in their affected L upper extremity from 2-5/10 to 2/10 or less with resistive functional use. 5) Patient to report 100% compliance with their splint wear, care, and precautions if needed. 6) Patient will demonstrate improved functional activity tolerance from fair to good for IADL completion and riding motorcycle. TODAY'S TREATMENT     Pain Level: 1/10 when patient arrived and increases up to 3/10 soreness with resistive use; 2-3/10 walking into today's session. Subjective: Pt stated \"I don't really have much pain, it's just that pulling sensation in the finger\"      Objective:  Updated POC to be completed by visit 6.   INTERVENTION: COMPLETED: SPECIFICS/COMMENTS:   Modality:     Paraffin Tx x - 10 min to decrease joint stiffness and promote mobility to soft tissues        AROM/AAROM:     Hand ROM ex X  X    X  X  X  X      X    X  x - isolated blocking  PIP (1 set) , DIP flex/ ext- 2x15 ea  - Fingertip curls around brown dowel -> advanced to pencil x 12  -PIP/DIP

## 2023-09-01 ENCOUNTER — TREATMENT (OUTPATIENT)
Dept: OCCUPATIONAL THERAPY | Age: 48
End: 2023-09-01

## 2023-09-01 DIAGNOSIS — M20.029 BOUTONNIERE DEFORMITY, UNSPECIFIED LATERALITY: Primary | ICD-10-CM

## 2023-09-01 NOTE — PROGRESS NOTES
increases up to 3/10 soreness with resistive use; 2-3/10 walking into today's session. Subjective: Pt stated \"I can make a fist now without difficulty. \"     Objective:  Updated POC to be completed by visit 6. INTERVENTION: COMPLETED: SPECIFICS/COMMENTS:   Modality:     Paraffin Tx x - 10 min to decrease joint stiffness and promote mobility to soft tissues        AROM/AAROM:     Hand ROM ex X  X    X  X  X  X       - isolated blocking  PIP (1 set) , DIP flex/ ext- 2x15 ea  - Fingertip curls around brown dowel -> advanced to pencil x 12  -PIP/DIP composite flexion place and holds x 6  -PIP extension place and holds x 10  -Hook fist (lead with tips), hold, then fold into full fist x 8  -DIP flexion against mild resistance x 12  -in hand manipulation with x-small beads  -in hand manipulation with coins (5 at a time)   -Exercise spheres- CW and CCW- 20x each.   -towel task- 10 reps   -Rubber band ex's- digit extension- 10 reps each band.  -Football fidget cube- MF only for 3 mins         PROM/Stretching:     PROM/ stretches X    X  - LMF as tolerated with focus on DIP flexion and PIP extension.  -Intrinsic hook stretch        Scar Mass/Edema Control:     Retrograde massage  - manual and with vibration        Strengthening:     L hand   -Yellow clothespin ax to pick and pass pom poms         Other:     Splinting X  X    x - PIP gutter --> for exercise and wear at night time. - LMB PIP extension splint---> reviewed proper positioning  - DIP flexion splint- minor flare volar under PIP to decrease pressure discomfort          Assessment/Comments:  Pt. Made good progress or met all goals. Will see for 1 more session to splint DIP again to address hyperextension sine his previous splint is uncomfortable to wear. Collaborated with OT on current plan of care.           Left Upper Extremity ROM /  KEY: Ext/Flex   AROM(PROM)                                                                     IE          9/1/23  3rd Digit MCP

## 2023-09-15 ENCOUNTER — TREATMENT (OUTPATIENT)
Dept: OCCUPATIONAL THERAPY | Age: 48
End: 2023-09-15

## 2023-09-15 DIAGNOSIS — M20.029 BOUTONNIERE DEFORMITY, UNSPECIFIED LATERALITY: Primary | ICD-10-CM

## 2023-09-15 NOTE — PROGRESS NOTES
OCCUPATIONAL THERAPY PROGRESS NOTE/ DISCHARGE SUMMARY  [address]  [facility]   [facility] [address]  Dept: [phone]  Loc: [address] OT Fax: [phone]      Date:  9/15/2023  Initial Evaluation Date: 23     Evaluating Therapist: EDWARD Whiting    Patient Name:  Tristen Gallo. 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 Erin Garg DO  Specific Practitioner Orders: LMB splinting of the MF until full extension is obtained, then figure-of-eight splinting. 6-8 weeks w/ ROM of the DIP joint. PROM, AAROM, AROM, stretching, strengthening, and modalities as needed. Splint needed: LMB then figure 8. Assessment of current deficits   [x] ROM           [x] Pain            [x] Activity tolerance   [x] Edema             OT PLAN OF CARE   OT POC based on physician orders, patient diagnosis and results of clinical assessment.      Frequency/Duration: 1-2x/wk for 12 visits  /  Certification Period From: 2023  To: 2023     Specific OT Treatment to include:   [x] Instruction in HEP                   Modalities:  [x] Therapeutic Exercise                 [x] Ultrasound             [x] PROM/Stretching                    [x] Fluidotherapy          [x]  Paraffin                   [x] AAROM  [x] AROM                 [x] Iontophoresis:   [x] Tendon Glides                         [x] Electrical Stimulation/Attended                    [x] ADL/IADL re-training                 [x] Desensitization/Nerve Stimulation     [x] Therapeutic Activity                  [x] Pain Management

## 2024-10-13 ENCOUNTER — APPOINTMENT (OUTPATIENT)
Dept: CT IMAGING | Age: 49
End: 2024-10-13
Payer: COMMERCIAL

## 2024-10-13 ENCOUNTER — HOSPITAL ENCOUNTER (EMERGENCY)
Age: 49
Discharge: HOME | End: 2024-10-13
Payer: COMMERCIAL

## 2024-10-13 VITALS
HEART RATE: 88 BPM | TEMPERATURE: 98.2 F | RESPIRATION RATE: 18 BRPM | OXYGEN SATURATION: 98 % | SYSTOLIC BLOOD PRESSURE: 125 MMHG | DIASTOLIC BLOOD PRESSURE: 74 MMHG

## 2024-10-13 DIAGNOSIS — M54.12 CERVICAL RADICULOPATHY: Primary | ICD-10-CM

## 2024-10-13 DIAGNOSIS — M50.30 BULGING OF CERVICAL INTERVERTEBRAL DISC: ICD-10-CM

## 2024-10-13 PROCEDURE — 72125 CT NECK SPINE W/O DYE: CPT

## 2024-10-13 PROCEDURE — 6370000000 HC RX 637 (ALT 250 FOR IP)

## 2024-10-13 PROCEDURE — 96372 THER/PROPH/DIAG INJ SC/IM: CPT

## 2024-10-13 PROCEDURE — 6360000002 HC RX W HCPCS

## 2024-10-13 PROCEDURE — 99284 EMERGENCY DEPT VISIT MOD MDM: CPT

## 2024-10-13 RX ORDER — NAPROXEN 500 MG/1
500 TABLET ORAL 2 TIMES DAILY WITH MEALS
Qty: 28 TABLET | Refills: 0 | Status: SHIPPED | OUTPATIENT
Start: 2024-10-13 | End: 2024-10-27

## 2024-10-13 RX ORDER — MORPHINE SULFATE 4 MG/ML
8 INJECTION, SOLUTION INTRAMUSCULAR; INTRAVENOUS ONCE
Status: COMPLETED | OUTPATIENT
Start: 2024-10-13 | End: 2024-10-13

## 2024-10-13 RX ORDER — ONDANSETRON 4 MG/1
4 TABLET, ORALLY DISINTEGRATING ORAL ONCE
Status: COMPLETED | OUTPATIENT
Start: 2024-10-13 | End: 2024-10-13

## 2024-10-13 RX ADMIN — ONDANSETRON 4 MG: 4 TABLET, ORALLY DISINTEGRATING ORAL at 10:13

## 2024-10-13 RX ADMIN — MORPHINE SULFATE 8 MG: 4 INJECTION, SOLUTION INTRAMUSCULAR; INTRAVENOUS at 10:13

## 2024-10-13 ASSESSMENT — PAIN DESCRIPTION - LOCATION: LOCATION: NECK

## 2024-10-13 ASSESSMENT — PAIN SCALES - GENERAL: PAINLEVEL_OUTOF10: 9

## 2024-10-13 ASSESSMENT — PAIN - FUNCTIONAL ASSESSMENT: PAIN_FUNCTIONAL_ASSESSMENT: PREVENTS OR INTERFERES SOME ACTIVE ACTIVITIES AND ADLS

## 2024-10-13 ASSESSMENT — PAIN DESCRIPTION - ORIENTATION: ORIENTATION: POSTERIOR

## 2024-10-13 ASSESSMENT — PAIN DESCRIPTION - DESCRIPTORS: DESCRIPTORS: SHARP;SHOOTING;THROBBING

## 2024-10-13 ASSESSMENT — LIFESTYLE VARIABLES
HOW MANY STANDARD DRINKS CONTAINING ALCOHOL DO YOU HAVE ON A TYPICAL DAY: PATIENT DOES NOT DRINK
HOW OFTEN DO YOU HAVE A DRINK CONTAINING ALCOHOL: NEVER

## 2024-10-13 NOTE — ED PROVIDER NOTES
(HCC).    Surgical History:  has a past surgical history that includes Appendectomy.    Social History:  reports that he has quit smoking. He has never used smokeless tobacco. He reports that he does not currently use alcohol. He reports that he does not use drugs.    Family History: family history is not on file.     Allergies: Patient has no known allergies.    Physical Exam   Oxygen Saturation Interpretation: Normal.        ED Triage Vitals   BP Systolic BP Percentile Diastolic BP Percentile Temp Temp src Pulse Respirations SpO2   10/13/24 0936 -- -- 10/13/24 0929 -- 10/13/24 0929 10/13/24 0936 10/13/24 0929   (!) 149/99   98.2 °F (36.8 °C)  91 18 98 %      Height Weight         -- --                         Constitutional:  Alert, development consistent with age.  HEENT:  NC/NT.  Airway patent.  Neck:  In collar: No.          Bony tenderness:  none.         Paraspinal tenderness:  level C4, C5, C6, and C7 to the right.        Trapezius Tenderness:  moderate to the right.         Crepitance: none.         Step off: No.        Tracheal deviation: none.          JVD: none.         ROM: diminished range with pain.        Skin:  no wounds, erythema, or swelling.  Chest:  Symmetrical without visible rash or tenderness.  Respiratory:  Clear to auscultation and breath sounds equal.  CV:  Regular rate and rhythm, normal heart sounds, without pathological murmurs.  GI:  Abdomen Soft, nontender.  No abrasions, ecchymoses, or lacerations.  Back:  No costovertebral, paravertebral, intervertebral, or vertebral tenderness or spasm.  Pelvis: non tender and stable to palpation.  Integument:  No abrasions, ecchymoses, or lacerations unless noted elsewhere.  Musculoskeletal:  No extremity tenderness or swelling.  Normal, painless range of motion of extremities.  No neurovascular deficit.  Lymphatics: No lymphangitis or adenopathy noted.  Neurological:  Orientation age-appropriate.  Motor functions intact.    Lab / Imaging Results

## 2024-10-13 NOTE — DISCHARGE INSTRUCTIONS
CT CERVICAL SPINE WO CONTRAST   Final Result   1. No acute osseous abnormality.   2. Degenerative change.  Suspected right-sided disc protrusion at C5-6.   Further evaluation with nonemergent MRI of the cervical spine is suggested.   3. Possible mild central canal stenosis at C6-7.         Please call Dr. Galeano to discuss MRI. I attached neurosurgery referral information to your discharge paperwork, Dr. Coronel. Continue normal medications for pain control. Apply heat to this area to help loosen up muscles. Naproxen was sent to your pharmacy for pick-up. If you develop any new or worsening symptoms before following up with your PCP, please return to the emergency department for reevaluation. Refrain from chiropractic adjustment until you see PCP and have MRI.

## 2024-11-21 ENCOUNTER — OFFICE VISIT (OUTPATIENT)
Dept: NEUROSURGERY | Age: 49
End: 2024-11-21
Payer: COMMERCIAL

## 2024-11-21 VITALS
HEIGHT: 69 IN | DIASTOLIC BLOOD PRESSURE: 90 MMHG | BODY MASS INDEX: 29.18 KG/M2 | WEIGHT: 197 LBS | HEART RATE: 91 BPM | OXYGEN SATURATION: 99 % | TEMPERATURE: 97.6 F | SYSTOLIC BLOOD PRESSURE: 129 MMHG

## 2024-11-21 DIAGNOSIS — M54.42 CHRONIC LEFT-SIDED LOW BACK PAIN WITH LEFT-SIDED SCIATICA: ICD-10-CM

## 2024-11-21 DIAGNOSIS — M54.12 CERVICAL RADICULOPATHY: Primary | ICD-10-CM

## 2024-11-21 DIAGNOSIS — G89.29 CHRONIC LEFT-SIDED LOW BACK PAIN WITH LEFT-SIDED SCIATICA: ICD-10-CM

## 2024-11-21 PROCEDURE — 99203 OFFICE O/P NEW LOW 30 MIN: CPT | Performed by: PHYSICIAN ASSISTANT

## 2024-11-21 RX ORDER — GABAPENTIN 300 MG/1
300 CAPSULE ORAL 3 TIMES DAILY
COMMUNITY

## 2024-11-21 RX ORDER — OXYCODONE AND ACETAMINOPHEN 5; 325 MG/1; MG/1
TABLET ORAL
COMMUNITY

## 2024-11-21 RX ORDER — ATORVASTATIN CALCIUM 20 MG/1
20 TABLET, FILM COATED ORAL DAILY
COMMUNITY
Start: 2024-10-10

## 2024-11-21 RX ORDER — SEMAGLUTIDE 1.34 MG/ML
INJECTION, SOLUTION SUBCUTANEOUS
COMMUNITY
Start: 2024-10-10

## 2024-11-21 RX ORDER — CITALOPRAM HYDROBROMIDE 40 MG/1
TABLET ORAL
COMMUNITY
Start: 2024-11-17

## 2024-11-21 RX ORDER — FENOFIBRATE 150 MG/1
1 CAPSULE ORAL
COMMUNITY
Start: 2024-05-21

## 2024-11-21 ASSESSMENT — ENCOUNTER SYMPTOMS
SHORTNESS OF BREATH: 0
BACK PAIN: 1
TROUBLE SWALLOWING: 0
PHOTOPHOBIA: 0
ABDOMINAL PAIN: 0

## 2024-11-21 NOTE — PROGRESS NOTES
[facility]      Subjective:  Selvin Bai is a 48 year old male presenting to the office today c/o neck pain with radiation down his right arm and acute on chronic low back pain with radiation down his left leg. Describes the pain as sharp, dull, aching, and burning. Admits to numbness in his hands. Lifting and walking makes the symptoms worse. He has tried oxycodone, zanaflex, gabapentin, heat, TENS unit, chiropractor, PT, and one cervical LETY at [facility] without significant lasting relief. Denies loss of bowel or bladder, saddle anesthesia, headache, loss of dexterity, abnormal gait, fever, chills, N/V, SOB, or chest pain.    Of note, patient uses nicotine patches    Review of Systems   Constitutional:  Negative for fever and unexpected weight change.   HENT:  Negative for trouble swallowing.    Eyes:  Negative for photophobia and visual disturbance.   Respiratory:  Negative for shortness of breath.    Cardiovascular:  Negative for chest pain.   Gastrointestinal:  Negative for abdominal pain.   Endocrine: Negative for heat intolerance.   Genitourinary:  Negative for flank pain.   Musculoskeletal:  Positive for arthralgias, back pain, neck pain and neck stiffness. Negative for myalgias.   Skin:  Negative for wound.   Neurological:  Positive for weakness and numbness. Negative for headaches.   Psychiatric/Behavioral:  Negative for confusion.        Objective:  Vitals:    11/21/24 1001   BP: (!) 129/90   Pulse: 91   Temp: 97.6 °F (36.4 °C)   SpO2: 99%       Physical Exam  Constitutional:       Appearance: Normal appearance. He is well-developed.   HENT:      Head: Normocephalic and atraumatic.   Eyes:      Extraocular Movements: Extraocular movements intact.      Conjunctiva/sclera: Conjunctivae normal.      Pupils: Pupils are equal, round, and reactive to light.   Neck:      Comments:  TTP cervical spine  Cardiovascular:      Rate and Rhythm: Normal rate.

## 2024-12-15 ENCOUNTER — HOSPITAL ENCOUNTER (OUTPATIENT)
Dept: MRI IMAGING | Age: 49
Discharge: HOME | End: 2024-12-17
Payer: COMMERCIAL

## 2024-12-15 DIAGNOSIS — M54.12 CERVICAL RADICULOPATHY: ICD-10-CM

## 2024-12-15 DIAGNOSIS — M54.42 CHRONIC LEFT-SIDED LOW BACK PAIN WITH LEFT-SIDED SCIATICA: ICD-10-CM

## 2024-12-15 DIAGNOSIS — G89.29 CHRONIC LEFT-SIDED LOW BACK PAIN WITH LEFT-SIDED SCIATICA: ICD-10-CM

## 2024-12-15 PROCEDURE — 72141 MRI NECK SPINE W/O DYE: CPT

## 2024-12-15 PROCEDURE — 72148 MRI LUMBAR SPINE W/O DYE: CPT

## 2024-12-26 ENCOUNTER — OFFICE VISIT (OUTPATIENT)
Dept: NEUROSURGERY | Age: 49
End: 2024-12-26

## 2024-12-26 VITALS — RESPIRATION RATE: 18 BRPM | HEIGHT: 69 IN | BODY MASS INDEX: 29.62 KG/M2 | WEIGHT: 200 LBS

## 2024-12-26 DIAGNOSIS — M54.2 NECK PAIN: Primary | ICD-10-CM

## 2024-12-26 PROCEDURE — 99213 OFFICE O/P EST LOW 20 MIN: CPT | Performed by: NEUROLOGICAL SURGERY

## 2024-12-26 NOTE — PROGRESS NOTES
Patient is here for follow up consult for: neck and bilateral arm pain.  The pain is rated as 7/10. He has some numbness and weakness in his arms.     Physical exam  Alert and Oriented X3  PERRLA, EOMI  WATTS 5/5 except 4/5 in bilateral UE  Sensation intact to LT and PP  Reflexes are 2+ and symmetric    A/P: patient is here for follow up for: neck pain.  His MRI shows stenosis at C5-C6 and C6-C7 with herniated disk. He has failed physical therapy and injections.  He is a non-smoker. I am recommending C5-C6 and C6-C7 anterior cervical diskectomy and fusion.  Risks, benefits and alternatives have been discussed an he wishes to proceed. He will call to schedule    Mayela Coronel MD

## 2025-01-09 ENCOUNTER — TELEPHONE (OUTPATIENT)
Dept: NEUROSURGERY | Age: 50
End: 2025-01-09

## 2025-01-09 NOTE — TELEPHONE ENCOUNTER
Patient called in to schedule surgery. I informed him that I would pass the message along for you to reach out to him. He asked that if he doesn't answer to please leave a message and he will call back. Thank you.

## 2025-02-03 ENCOUNTER — PREP FOR PROCEDURE (OUTPATIENT)
Dept: NEUROSURGERY | Age: 50
End: 2025-02-03

## 2025-02-03 DIAGNOSIS — M50.20 DISPLACEMENT OF CERVICAL INTERVERTEBRAL DISC: ICD-10-CM

## 2025-02-03 DIAGNOSIS — Z01.818 PRE-OP TESTING: Primary | ICD-10-CM

## 2025-02-03 RX ORDER — SODIUM CHLORIDE 0.9 % (FLUSH) 0.9 %
5-40 SYRINGE (ML) INJECTION PRN
Status: CANCELLED | OUTPATIENT
Start: 2025-02-03

## 2025-02-03 RX ORDER — SODIUM CHLORIDE 0.9 % (FLUSH) 0.9 %
5-40 SYRINGE (ML) INJECTION EVERY 12 HOURS SCHEDULED
Status: CANCELLED | OUTPATIENT
Start: 2025-02-03

## 2025-02-03 RX ORDER — SODIUM CHLORIDE 9 MG/ML
INJECTION, SOLUTION INTRAVENOUS CONTINUOUS
Status: CANCELLED | OUTPATIENT
Start: 2025-02-03

## 2025-02-03 RX ORDER — SODIUM CHLORIDE 9 MG/ML
INJECTION, SOLUTION INTRAVENOUS PRN
Status: CANCELLED | OUTPATIENT
Start: 2025-02-03

## 2025-02-13 DIAGNOSIS — M50.20 DISPLACEMENT OF CERVICAL INTERVERTEBRAL DISC: Primary | ICD-10-CM

## 2025-02-24 NOTE — PROGRESS NOTES
[facility]   PRE-ADMISSION TESTING GENERAL INSTRUCTIONS  PAT Phone Number: [phone]    GENERAL INSTRUCTIONS:    [x] The Night Before Surgery: Take an antibacterial soap shower - followed by CHG Wipes.   -The Morning of Surgery: Repeat CHG Wipes.  [x] Do not wear lotions, powders, deodorant the morning of surgery.  [x] No solid food after midnight. You may have SIPS of clear liquids up until 2 hours before your arrival time to the hospital.   [x] You may brush your teeth, gargle, but do not swallow water.   [x] No tobacco products, illegal drugs, or alcohol within 24 hours of your surgery.  [x] Jewelry or valuables should not be brought to the hospital. All body and/or tongue piercing's must be removed prior to arriving to hospital. No contact lens or hair pins.   [x] Arrange transportation with a responsible adult  to and from the hospital. Arrange for someone to be with you for the remainder of the day and for 24 hours after your procedure due to having had anesthesia.          -Who will be your  for transportation? wife        -Who will be staying with you for 24 hrs after your procedure? wife  [x] Bring insurance card and photo ID.  [x] Bring copy of living will or healthcare power of  papers to be placed in your electronic record.  [x] Transfusion (Green) Bracelet: Please bring with you to hospital, day of surgery.       PARKING INSTRUCTIONS:     [x] ARRIVAL DATE & TIME: Wednesday 3/5 at 5 am  [x] Times are subject to change. We will contact you the business day before surgery if that were to occur.  [x] Enter into the [facility] Entrance. Two people may accompany you. Masks are not required.  [x] Parking Lot \"I\" is where you will park. It is located on the corner of [address] and [address]. The entrance is on [address].   Only one vehicle - per patient, is permitted in parking lot.   Upon entering the parking lot, a voucher ticket will

## 2025-02-28 ENCOUNTER — HOSPITAL ENCOUNTER (OUTPATIENT)
Dept: GENERAL RADIOLOGY | Age: 50
End: 2025-02-28
Payer: COMMERCIAL

## 2025-02-28 ENCOUNTER — HOSPITAL ENCOUNTER (OUTPATIENT)
Dept: PREADMISSION TESTING | Age: 50
Discharge: HOME | End: 2025-02-28
Payer: COMMERCIAL

## 2025-02-28 VITALS
DIASTOLIC BLOOD PRESSURE: 72 MMHG | BODY MASS INDEX: 29.62 KG/M2 | WEIGHT: 200 LBS | SYSTOLIC BLOOD PRESSURE: 142 MMHG | HEIGHT: 69 IN | TEMPERATURE: 97.6 F | OXYGEN SATURATION: 96 % | HEART RATE: 91 BPM | RESPIRATION RATE: 18 BRPM

## 2025-02-28 DIAGNOSIS — Z01.818 PRE-OP TESTING: ICD-10-CM

## 2025-02-28 DIAGNOSIS — Z01.812 PRE-OPERATIVE LABORATORY EXAMINATION: ICD-10-CM

## 2025-02-28 LAB
ABO + RH BLD: NORMAL
ANION GAP SERPL CALCULATED.3IONS-SCNC: 16 MMOL/L (ref 7–16)
ARM BAND NUMBER: NORMAL
BASOPHILS # BLD: 0.12 K/UL (ref 0–0.2)
BASOPHILS NFR BLD: 1 % (ref 0–2)
BILIRUB UR QL STRIP: NEGATIVE
BLOOD BANK SAMPLE EXPIRATION: NORMAL
BLOOD GROUP ANTIBODIES SERPL: NEGATIVE
BUN SERPL-MCNC: 16 MG/DL (ref 6–20)
CALCIUM SERPL-MCNC: 9.1 MG/DL (ref 8.6–10.2)
CHLORIDE SERPL-SCNC: 99 MMOL/L (ref 98–107)
CLARITY UR: CLEAR
CO2 SERPL-SCNC: 21 MMOL/L (ref 22–29)
COLOR UR: YELLOW
CREAT SERPL-MCNC: 1.1 MG/DL (ref 0.7–1.2)
EKG ATRIAL RATE: 80 BPM
EKG P AXIS: 40 DEGREES
EKG P-R INTERVAL: 166 MS
EKG Q-T INTERVAL: 386 MS
EKG QRS DURATION: 90 MS
EKG QTC CALCULATION (BAZETT): 445 MS
EKG R AXIS: -12 DEGREES
EKG T AXIS: 1 DEGREES
EKG VENTRICULAR RATE: 80 BPM
EOSINOPHIL # BLD: 0.53 K/UL (ref 0.05–0.5)
EOSINOPHILS RELATIVE PERCENT: 6 % (ref 0–6)
ERYTHROCYTE [DISTWIDTH] IN BLOOD BY AUTOMATED COUNT: 12.4 % (ref 11.5–15)
GFR, ESTIMATED: 79 ML/MIN/1.73M2
GLUCOSE SERPL-MCNC: 264 MG/DL (ref 74–99)
GLUCOSE UR STRIP-MCNC: NEGATIVE MG/DL
HCT VFR BLD AUTO: 45 % (ref 37–54)
HGB BLD-MCNC: 14.8 G/DL (ref 12.5–16.5)
HGB UR QL STRIP.AUTO: NEGATIVE
IMM GRANULOCYTES # BLD AUTO: 0.14 K/UL (ref 0–0.58)
IMM GRANULOCYTES NFR BLD: 2 % (ref 0–5)
INR PPP: 1
KETONES UR STRIP-MCNC: NEGATIVE MG/DL
LEUKOCYTE ESTERASE UR QL STRIP: NEGATIVE
LYMPHOCYTES NFR BLD: 2.16 K/UL (ref 1.5–4)
LYMPHOCYTES RELATIVE PERCENT: 25 % (ref 20–42)
MCH RBC QN AUTO: 29.2 PG (ref 26–35)
MCHC RBC AUTO-ENTMCNC: 32.9 G/DL (ref 32–34.5)
MCV RBC AUTO: 88.9 FL (ref 80–99.9)
MONOCYTES NFR BLD: 0.59 K/UL (ref 0.1–0.95)
MONOCYTES NFR BLD: 7 % (ref 2–12)
NEUTROPHILS NFR BLD: 59 % (ref 43–80)
NEUTS SEG NFR BLD: 5.01 K/UL (ref 1.8–7.3)
NITRITE UR QL STRIP: NEGATIVE
PH UR STRIP: 6 [PH] (ref 5–8)
PLATELET # BLD AUTO: 278 K/UL (ref 130–450)
PMV BLD AUTO: 10.4 FL (ref 7–12)
POTASSIUM SERPL-SCNC: 4.8 MMOL/L (ref 3.5–5)
PROT UR STRIP-MCNC: NEGATIVE MG/DL
PROTHROMBIN TIME: 10.7 SEC (ref 9.3–12.4)
RBC # BLD AUTO: 5.06 M/UL (ref 3.8–5.8)
SODIUM SERPL-SCNC: 136 MMOL/L (ref 132–146)
SP GR UR STRIP: <1.005 (ref 1–1.03)
UROBILINOGEN UR STRIP-ACNC: 0.2 EU/DL (ref 0–1)
WBC OTHER # BLD: 8.6 K/UL (ref 4.5–11.5)

## 2025-02-28 PROCEDURE — 87081 CULTURE SCREEN ONLY: CPT

## 2025-02-28 PROCEDURE — 86850 RBC ANTIBODY SCREEN: CPT

## 2025-02-28 PROCEDURE — 80048 BASIC METABOLIC PNL TOTAL CA: CPT

## 2025-02-28 PROCEDURE — 86900 BLOOD TYPING SEROLOGIC ABO: CPT

## 2025-02-28 PROCEDURE — 36415 COLL VENOUS BLD VENIPUNCTURE: CPT

## 2025-02-28 PROCEDURE — 85610 PROTHROMBIN TIME: CPT

## 2025-02-28 PROCEDURE — 85025 COMPLETE CBC W/AUTO DIFF WBC: CPT

## 2025-02-28 PROCEDURE — 86901 BLOOD TYPING SEROLOGIC RH(D): CPT

## 2025-02-28 PROCEDURE — 87086 URINE CULTURE/COLONY COUNT: CPT

## 2025-02-28 PROCEDURE — 71046 X-RAY EXAM CHEST 2 VIEWS: CPT

## 2025-03-01 LAB
MICROORGANISM SPEC CULT: ABNORMAL
MICROORGANISM SPEC CULT: NORMAL
SERVICE CMNT-IMP: ABNORMAL
SPECIMEN DESCRIPTION: ABNORMAL
SPECIMEN DESCRIPTION: NORMAL

## 2025-03-04 ENCOUNTER — ANESTHESIA EVENT (OUTPATIENT)
Dept: OPERATING ROOM | Age: 50
End: 2025-03-04
Payer: COMMERCIAL

## 2025-03-04 NOTE — H&P
Selvin Bai is a 49 year old male presenting to the office today c/o neck pain with radiation down his right arm and acute on chronic low back pain with radiation down his left leg. Describes the pain as sharp, dull, aching, and burning. Admits to numbness in his hands. Lifting and walking makes the symptoms worse. He has tried oxycodone, zanaflex, gabapentin, heat, TENS unit, chiropractor, PT, and one cervical LETY at [facility] without significant lasting relief. Denies loss of bowel or bladder, saddle anesthesia, headache, loss of dexterity, abnormal gait, fever, chills, N/V, SOB, or chest pain.     Past Medical History:   Diagnosis Date    Diabetes mellitus (HCC)      Past Surgical History:   Procedure Laterality Date    APPENDECTOMY      HERNIA REPAIR       Social History     Socioeconomic History    Marital status:      Spouse name: Not on file    Number of children: Not on file    Years of education: Not on file    Highest education level: Not on file   Occupational History    Not on file   Tobacco Use    Smoking status: Former     Passive exposure: Past    Smokeless tobacco: Never   Vaping Use    Vaping status: Never Used   Substance and Sexual Activity    Alcohol use: Not Currently    Drug use: Never    Sexual activity: Yes     Partners: Female   Other Topics Concern    Not on file   Social History Narrative    Not on file     Social Determinants of Health     Financial Resource Strain: Not on file   Food Insecurity: Not on file   Transportation Needs: Not on file   Physical Activity: Not on file   Stress: Not on file   Social Connections: Not on file   Intimate Partner Violence: Not on file   Housing Stability: Not on file     No family history on file.  Scheduled Meds:  Continuous Infusions:  PRN Meds:.    No Known Allergies       Review of Systems   Constitutional:  Negative for fever and unexpected weight change.   HENT:  Negative for trouble swallowing.    Eyes:  Negative for

## 2025-03-04 NOTE — ANESTHESIA PRE PROCEDURE
GI/Hepatic/Renal:             Endo/Other:    (+) DiabetesType II DM.                 Abdominal:   (+) obese          Vascular:          Other Findings:         Anesthesia Plan      general     ASA 2     (Glydescope intubation to decrease cervical spine extension)  Induction: intravenous.  continuous noninvasive hemodynamic monitor  MIPS: Postoperative opioids intended and Prophylactic antiemetics administered.  Anesthetic plan and risks discussed with patient.    Use of blood products discussed with patient whom consented to blood products.    Plan discussed with CRNA.                Rex Rasmussen MD   3/4/2025

## 2025-03-05 ENCOUNTER — HOSPITAL ENCOUNTER (OUTPATIENT)
Age: 50
Setting detail: OBSERVATION
Discharge: HOME | End: 2025-03-06
Attending: NEUROLOGICAL SURGERY | Admitting: NEUROLOGICAL SURGERY
Payer: COMMERCIAL

## 2025-03-05 ENCOUNTER — ANESTHESIA (OUTPATIENT)
Dept: OPERATING ROOM | Age: 50
End: 2025-03-05
Payer: COMMERCIAL

## 2025-03-05 ENCOUNTER — APPOINTMENT (OUTPATIENT)
Dept: GENERAL RADIOLOGY | Age: 50
End: 2025-03-05
Attending: NEUROLOGICAL SURGERY
Payer: COMMERCIAL

## 2025-03-05 DIAGNOSIS — M50.20 DISPLACEMENT OF CERVICAL INTERVERTEBRAL DISC: ICD-10-CM

## 2025-03-05 DIAGNOSIS — Z01.812 PRE-OPERATIVE LABORATORY EXAMINATION: Primary | ICD-10-CM

## 2025-03-05 PROBLEM — E11.9 TYPE 2 DIABETES MELLITUS WITHOUT COMPLICATION, WITHOUT LONG-TERM CURRENT USE OF INSULIN: Status: ACTIVE | Noted: 2025-03-05

## 2025-03-05 PROBLEM — E78.2 MIXED HYPERLIPIDEMIA: Status: ACTIVE | Noted: 2025-03-05

## 2025-03-05 LAB
ANION GAP SERPL CALCULATED.3IONS-SCNC: 22 MMOL/L (ref 7–16)
BUN SERPL-MCNC: 24 MG/DL (ref 6–20)
CALCIUM SERPL-MCNC: 9 MG/DL (ref 8.6–10.2)
CHLORIDE SERPL-SCNC: 97 MMOL/L (ref 98–107)
CO2 SERPL-SCNC: 16 MMOL/L (ref 22–29)
CREAT SERPL-MCNC: 1.3 MG/DL (ref 0.7–1.2)
ERYTHROCYTE [DISTWIDTH] IN BLOOD BY AUTOMATED COUNT: 12.5 % (ref 11.5–15)
GFR, ESTIMATED: 66 ML/MIN/1.73M2
GLUCOSE BLD-MCNC: 162 MG/DL (ref 74–99)
GLUCOSE BLD-MCNC: 182 MG/DL (ref 74–99)
GLUCOSE BLD-MCNC: 213 MG/DL (ref 74–99)
GLUCOSE BLD-MCNC: 297 MG/DL (ref 74–99)
GLUCOSE SERPL-MCNC: 339 MG/DL (ref 74–99)
HCT VFR BLD AUTO: 44.6 % (ref 37–54)
HGB BLD-MCNC: 14.6 G/DL (ref 12.5–16.5)
MCH RBC QN AUTO: 29.6 PG (ref 26–35)
MCHC RBC AUTO-ENTMCNC: 32.7 G/DL (ref 32–34.5)
MCV RBC AUTO: 90.5 FL (ref 80–99.9)
PLATELET # BLD AUTO: 289 K/UL (ref 130–450)
PMV BLD AUTO: 10.9 FL (ref 7–12)
POTASSIUM SERPL-SCNC: 4.8 MMOL/L (ref 3.5–5)
RBC # BLD AUTO: 4.93 M/UL (ref 3.8–5.8)
SODIUM SERPL-SCNC: 135 MMOL/L (ref 132–146)
WBC OTHER # BLD: 17.5 K/UL (ref 4.5–11.5)

## 2025-03-05 PROCEDURE — 6360000002 HC RX W HCPCS: Performed by: ANESTHESIOLOGY

## 2025-03-05 PROCEDURE — G0378 HOSPITAL OBSERVATION PER HR: HCPCS

## 2025-03-05 PROCEDURE — 3600000004 HC SURGERY LEVEL 4 BASE: Performed by: NEUROLOGICAL SURGERY

## 2025-03-05 PROCEDURE — 3600000014 HC SURGERY LEVEL 4 ADDTL 15MIN: Performed by: NEUROLOGICAL SURGERY

## 2025-03-05 PROCEDURE — 97535 SELF CARE MNGMENT TRAINING: CPT

## 2025-03-05 PROCEDURE — 2709999900 HC NON-CHARGEABLE SUPPLY: Performed by: NEUROLOGICAL SURGERY

## 2025-03-05 PROCEDURE — 88304 TISSUE EXAM BY PATHOLOGIST: CPT

## 2025-03-05 PROCEDURE — 85027 COMPLETE CBC AUTOMATED: CPT

## 2025-03-05 PROCEDURE — 7100000001 HC PACU RECOVERY - ADDTL 15 MIN: Performed by: NEUROLOGICAL SURGERY

## 2025-03-05 PROCEDURE — 2500000003 HC RX 250 WO HCPCS: Performed by: NEUROLOGICAL SURGERY

## 2025-03-05 PROCEDURE — 3700000001 HC ADD 15 MINUTES (ANESTHESIA): Performed by: NEUROLOGICAL SURGERY

## 2025-03-05 PROCEDURE — 2580000003 HC RX 258: Performed by: STUDENT IN AN ORGANIZED HEALTH CARE EDUCATION/TRAINING PROGRAM

## 2025-03-05 PROCEDURE — C1713 ANCHOR/SCREW BN/BN,TIS/BN: HCPCS | Performed by: NEUROLOGICAL SURGERY

## 2025-03-05 PROCEDURE — 2720000010 HC SURG SUPPLY STERILE: Performed by: NEUROLOGICAL SURGERY

## 2025-03-05 PROCEDURE — 2500000003 HC RX 250 WO HCPCS: Performed by: STUDENT IN AN ORGANIZED HEALTH CARE EDUCATION/TRAINING PROGRAM

## 2025-03-05 PROCEDURE — 6370000000 HC RX 637 (ALT 250 FOR IP): Performed by: NEUROLOGICAL SURGERY

## 2025-03-05 PROCEDURE — 6360000002 HC RX W HCPCS: Performed by: NEUROLOGICAL SURGERY

## 2025-03-05 PROCEDURE — 97530 THERAPEUTIC ACTIVITIES: CPT

## 2025-03-05 PROCEDURE — 7100000000 HC PACU RECOVERY - FIRST 15 MIN: Performed by: NEUROLOGICAL SURGERY

## 2025-03-05 PROCEDURE — C1889 IMPLANT/INSERT DEVICE, NOC: HCPCS | Performed by: NEUROLOGICAL SURGERY

## 2025-03-05 PROCEDURE — 22845 INSERT SPINE FIXATION DEVICE: CPT | Performed by: NEUROLOGICAL SURGERY

## 2025-03-05 PROCEDURE — 36415 COLL VENOUS BLD VENIPUNCTURE: CPT

## 2025-03-05 PROCEDURE — 97165 OT EVAL LOW COMPLEX 30 MIN: CPT

## 2025-03-05 PROCEDURE — 6360000002 HC RX W HCPCS: Performed by: STUDENT IN AN ORGANIZED HEALTH CARE EDUCATION/TRAINING PROGRAM

## 2025-03-05 PROCEDURE — 97161 PT EVAL LOW COMPLEX 20 MIN: CPT

## 2025-03-05 PROCEDURE — 82962 GLUCOSE BLOOD TEST: CPT

## 2025-03-05 PROCEDURE — 6360000002 HC RX W HCPCS: Performed by: NURSE ANESTHETIST, CERTIFIED REGISTERED

## 2025-03-05 PROCEDURE — 3700000000 HC ANESTHESIA ATTENDED CARE: Performed by: NEUROLOGICAL SURGERY

## 2025-03-05 PROCEDURE — 2500000003 HC RX 250 WO HCPCS: Performed by: NURSE ANESTHETIST, CERTIFIED REGISTERED

## 2025-03-05 PROCEDURE — 99222 1ST HOSP IP/OBS MODERATE 55: CPT | Performed by: NURSE PRACTITIONER

## 2025-03-05 PROCEDURE — 22552 ARTHRD ANT NTRBD CERVICAL EA: CPT | Performed by: NEUROLOGICAL SURGERY

## 2025-03-05 PROCEDURE — 22551 ARTHRD ANT NTRBDY CERVICAL: CPT | Performed by: NEUROLOGICAL SURGERY

## 2025-03-05 PROCEDURE — 80048 BASIC METABOLIC PNL TOTAL CA: CPT

## 2025-03-05 PROCEDURE — 2580000003 HC RX 258: Performed by: NURSE ANESTHETIST, CERTIFIED REGISTERED

## 2025-03-05 DEVICE — GRAFT BNE SUB W12XH7XL14MM CANC CORT ANT CERV INTBDY FUS: Type: IMPLANTABLE DEVICE | Site: SPINE CERVICAL | Status: FUNCTIONAL

## 2025-03-05 DEVICE — XTEND 4.2MM VARIABLE ANGLE SCREW, SELF-DRILLING, 16MM
Type: IMPLANTABLE DEVICE | Site: SPINE CERVICAL | Status: FUNCTIONAL
Brand: XTEND

## 2025-03-05 DEVICE — XTEND ANTERIOR CERVICAL PLATE, 2-LEVEL, 30MM
Type: IMPLANTABLE DEVICE | Site: SPINE CERVICAL | Status: FUNCTIONAL
Brand: XTEND

## 2025-03-05 RX ORDER — SODIUM CHLORIDE 0.9 % (FLUSH) 0.9 %
5-40 SYRINGE (ML) INJECTION PRN
Status: DISCONTINUED | OUTPATIENT
Start: 2025-03-05 | End: 2025-03-05 | Stop reason: HOSPADM

## 2025-03-05 RX ORDER — DOCUSATE SODIUM 100 MG/1
200 CAPSULE, LIQUID FILLED ORAL 2 TIMES DAILY
Status: DISCONTINUED | OUTPATIENT
Start: 2025-03-05 | End: 2025-03-06 | Stop reason: HOSPADM

## 2025-03-05 RX ORDER — BISACODYL 5 MG/1
5 TABLET, DELAYED RELEASE ORAL DAILY
Status: DISCONTINUED | OUTPATIENT
Start: 2025-03-05 | End: 2025-03-06 | Stop reason: HOSPADM

## 2025-03-05 RX ORDER — MEPERIDINE HYDROCHLORIDE 25 MG/ML
12.5 INJECTION INTRAMUSCULAR; INTRAVENOUS; SUBCUTANEOUS EVERY 5 MIN PRN
Status: DISCONTINUED | OUTPATIENT
Start: 2025-03-05 | End: 2025-03-05 | Stop reason: HOSPADM

## 2025-03-05 RX ORDER — DIPHENHYDRAMINE HCL 25 MG
25 TABLET ORAL EVERY 6 HOURS PRN
Status: DISCONTINUED | OUTPATIENT
Start: 2025-03-05 | End: 2025-03-06 | Stop reason: HOSPADM

## 2025-03-05 RX ORDER — SODIUM CHLORIDE 9 MG/ML
INJECTION, SOLUTION INTRAVENOUS CONTINUOUS
Status: DISCONTINUED | OUTPATIENT
Start: 2025-03-05 | End: 2025-03-05 | Stop reason: HOSPADM

## 2025-03-05 RX ORDER — GABAPENTIN 300 MG/1
300 CAPSULE ORAL 4 TIMES DAILY
Status: DISCONTINUED | OUTPATIENT
Start: 2025-03-05 | End: 2025-03-06 | Stop reason: HOSPADM

## 2025-03-05 RX ORDER — NALOXONE HYDROCHLORIDE 0.4 MG/ML
INJECTION, SOLUTION INTRAMUSCULAR; INTRAVENOUS; SUBCUTANEOUS PRN
Status: DISCONTINUED | OUTPATIENT
Start: 2025-03-05 | End: 2025-03-05 | Stop reason: HOSPADM

## 2025-03-05 RX ORDER — PROCHLORPERAZINE EDISYLATE 5 MG/ML
10 INJECTION INTRAMUSCULAR; INTRAVENOUS EVERY 6 HOURS PRN
Status: DISCONTINUED | OUTPATIENT
Start: 2025-03-05 | End: 2025-03-06 | Stop reason: SDUPTHER

## 2025-03-05 RX ORDER — SODIUM CHLORIDE 9 MG/ML
INJECTION, SOLUTION INTRAVENOUS PRN
Status: DISCONTINUED | OUTPATIENT
Start: 2025-03-05 | End: 2025-03-05 | Stop reason: HOSPADM

## 2025-03-05 RX ORDER — GLUCAGON 1 MG/ML
1 KIT INJECTION PRN
Status: DISCONTINUED | OUTPATIENT
Start: 2025-03-05 | End: 2025-03-06 | Stop reason: HOSPADM

## 2025-03-05 RX ORDER — MAGNESIUM HYDROXIDE/ALUMINUM HYDROXICE/SIMETHICONE 120; 1200; 1200 MG/30ML; MG/30ML; MG/30ML
15 SUSPENSION ORAL EVERY 6 HOURS PRN
Status: DISCONTINUED | OUTPATIENT
Start: 2025-03-05 | End: 2025-03-06 | Stop reason: HOSPADM

## 2025-03-05 RX ORDER — BISACODYL 10 MG
10 SUPPOSITORY, RECTAL RECTAL DAILY PRN
Status: DISCONTINUED | OUTPATIENT
Start: 2025-03-05 | End: 2025-03-06 | Stop reason: HOSPADM

## 2025-03-05 RX ORDER — SODIUM CHLORIDE 0.9 % (FLUSH) 0.9 %
5-40 SYRINGE (ML) INJECTION EVERY 12 HOURS SCHEDULED
Status: DISCONTINUED | OUTPATIENT
Start: 2025-03-05 | End: 2025-03-05 | Stop reason: HOSPADM

## 2025-03-05 RX ORDER — SODIUM CHLORIDE 0.9 % (FLUSH) 0.9 %
5-40 SYRINGE (ML) INJECTION EVERY 12 HOURS SCHEDULED
Status: DISCONTINUED | OUTPATIENT
Start: 2025-03-05 | End: 2025-03-06 | Stop reason: HOSPADM

## 2025-03-05 RX ORDER — SODIUM CHLORIDE 0.9 % (FLUSH) 0.9 %
5-40 SYRINGE (ML) INJECTION PRN
Status: DISCONTINUED | OUTPATIENT
Start: 2025-03-05 | End: 2025-03-06 | Stop reason: HOSPADM

## 2025-03-05 RX ORDER — HYDROMORPHONE HYDROCHLORIDE 1 MG/ML
0.5 INJECTION, SOLUTION INTRAMUSCULAR; INTRAVENOUS; SUBCUTANEOUS EVERY 5 MIN PRN
Status: DISCONTINUED | OUTPATIENT
Start: 2025-03-05 | End: 2025-03-05 | Stop reason: HOSPADM

## 2025-03-05 RX ORDER — SENNA AND DOCUSATE SODIUM 50; 8.6 MG/1; MG/1
1 TABLET, FILM COATED ORAL 2 TIMES DAILY
Status: DISCONTINUED | OUTPATIENT
Start: 2025-03-05 | End: 2025-03-06 | Stop reason: HOSPADM

## 2025-03-05 RX ORDER — ACETAMINOPHEN 325 MG/1
650 TABLET ORAL EVERY 6 HOURS
Status: DISCONTINUED | OUTPATIENT
Start: 2025-03-05 | End: 2025-03-06 | Stop reason: HOSPADM

## 2025-03-05 RX ORDER — DEXTROSE MONOHYDRATE 100 MG/ML
INJECTION, SOLUTION INTRAVENOUS CONTINUOUS PRN
Status: DISCONTINUED | OUTPATIENT
Start: 2025-03-05 | End: 2025-03-06 | Stop reason: HOSPADM

## 2025-03-05 RX ORDER — SODIUM PHOSPHATE, DIBASIC AND SODIUM PHOSPHATE, MONOBASIC 7; 19 G/230ML; G/230ML
1 ENEMA RECTAL DAILY PRN
Status: DISCONTINUED | OUTPATIENT
Start: 2025-03-05 | End: 2025-03-06 | Stop reason: HOSPADM

## 2025-03-05 RX ORDER — POLYETHYLENE GLYCOL 3350 17 G/17G
17 POWDER, FOR SOLUTION ORAL DAILY
Status: DISCONTINUED | OUTPATIENT
Start: 2025-03-05 | End: 2025-03-06 | Stop reason: HOSPADM

## 2025-03-05 RX ORDER — MIDAZOLAM HYDROCHLORIDE 1 MG/ML
INJECTION, SOLUTION INTRAMUSCULAR; INTRAVENOUS
Status: DISCONTINUED | OUTPATIENT
Start: 2025-03-05 | End: 2025-03-05 | Stop reason: SDUPTHER

## 2025-03-05 RX ORDER — OXYCODONE HYDROCHLORIDE 5 MG/1
5 TABLET ORAL EVERY 4 HOURS PRN
Qty: 42 TABLET | Refills: 0 | Status: SHIPPED | OUTPATIENT
Start: 2025-03-05 | End: 2025-03-12

## 2025-03-05 RX ORDER — DIAZEPAM 5 MG/1
5 TABLET ORAL EVERY 6 HOURS PRN
Status: DISCONTINUED | OUTPATIENT
Start: 2025-03-05 | End: 2025-03-06 | Stop reason: HOSPADM

## 2025-03-05 RX ORDER — ATORVASTATIN CALCIUM 20 MG/1
20 TABLET, FILM COATED ORAL DAILY
Status: DISCONTINUED | OUTPATIENT
Start: 2025-03-05 | End: 2025-03-06 | Stop reason: HOSPADM

## 2025-03-05 RX ORDER — FENOFIBRATE 160 MG/1
160 TABLET ORAL DAILY
Status: DISCONTINUED | OUTPATIENT
Start: 2025-03-05 | End: 2025-03-06 | Stop reason: HOSPADM

## 2025-03-05 RX ORDER — MORPHINE SULFATE 4 MG/ML
4 INJECTION, SOLUTION INTRAMUSCULAR; INTRAVENOUS
Status: DISCONTINUED | OUTPATIENT
Start: 2025-03-05 | End: 2025-03-06 | Stop reason: HOSPADM

## 2025-03-05 RX ORDER — ROCURONIUM BROMIDE 10 MG/ML
INJECTION, SOLUTION INTRAVENOUS
Status: DISCONTINUED | OUTPATIENT
Start: 2025-03-05 | End: 2025-03-05 | Stop reason: SDUPTHER

## 2025-03-05 RX ORDER — OXYCODONE HYDROCHLORIDE 10 MG/1
10 TABLET ORAL EVERY 4 HOURS PRN
Status: DISCONTINUED | OUTPATIENT
Start: 2025-03-05 | End: 2025-03-06 | Stop reason: HOSPADM

## 2025-03-05 RX ORDER — VANCOMYCIN HYDROCHLORIDE 500 MG/10ML
INJECTION, POWDER, LYOPHILIZED, FOR SOLUTION INTRAVENOUS PRN
Status: DISCONTINUED | OUTPATIENT
Start: 2025-03-05 | End: 2025-03-05 | Stop reason: ALTCHOICE

## 2025-03-05 RX ORDER — DIPHENHYDRAMINE HYDROCHLORIDE 50 MG/ML
25 INJECTION INTRAMUSCULAR; INTRAVENOUS EVERY 6 HOURS PRN
Status: DISCONTINUED | OUTPATIENT
Start: 2025-03-05 | End: 2025-03-06 | Stop reason: HOSPADM

## 2025-03-05 RX ORDER — ONDANSETRON 4 MG/1
4 TABLET, ORALLY DISINTEGRATING ORAL EVERY 8 HOURS PRN
Status: DISCONTINUED | OUTPATIENT
Start: 2025-03-05 | End: 2025-03-06 | Stop reason: HOSPADM

## 2025-03-05 RX ORDER — ONDANSETRON 2 MG/ML
4 INJECTION INTRAMUSCULAR; INTRAVENOUS EVERY 6 HOURS PRN
Status: DISCONTINUED | OUTPATIENT
Start: 2025-03-05 | End: 2025-03-06 | Stop reason: HOSPADM

## 2025-03-05 RX ORDER — INSULIN LISPRO 100 [IU]/ML
0-8 INJECTION, SOLUTION INTRAVENOUS; SUBCUTANEOUS
Status: DISCONTINUED | OUTPATIENT
Start: 2025-03-05 | End: 2025-03-06 | Stop reason: HOSPADM

## 2025-03-05 RX ORDER — DEXAMETHASONE SODIUM PHOSPHATE 10 MG/ML
INJECTION, SOLUTION INTRA-ARTICULAR; INTRALESIONAL; INTRAMUSCULAR; INTRAVENOUS; SOFT TISSUE
Status: DISCONTINUED | OUTPATIENT
Start: 2025-03-05 | End: 2025-03-05 | Stop reason: SDUPTHER

## 2025-03-05 RX ORDER — OXYCODONE HYDROCHLORIDE 5 MG/1
5 TABLET ORAL EVERY 4 HOURS PRN
Status: DISCONTINUED | OUTPATIENT
Start: 2025-03-05 | End: 2025-03-06 | Stop reason: HOSPADM

## 2025-03-05 RX ORDER — ONDANSETRON 2 MG/ML
INJECTION INTRAMUSCULAR; INTRAVENOUS
Status: DISCONTINUED | OUTPATIENT
Start: 2025-03-05 | End: 2025-03-05 | Stop reason: SDUPTHER

## 2025-03-05 RX ORDER — MORPHINE SULFATE 2 MG/ML
2 INJECTION, SOLUTION INTRAMUSCULAR; INTRAVENOUS
Status: DISCONTINUED | OUTPATIENT
Start: 2025-03-05 | End: 2025-03-06 | Stop reason: HOSPADM

## 2025-03-05 RX ORDER — CITALOPRAM HYDROBROMIDE 20 MG/1
40 TABLET ORAL DAILY
Status: DISCONTINUED | OUTPATIENT
Start: 2025-03-05 | End: 2025-03-06 | Stop reason: HOSPADM

## 2025-03-05 RX ORDER — LIDOCAINE HYDROCHLORIDE 20 MG/ML
INJECTION, SOLUTION INTRAVENOUS
Status: DISCONTINUED | OUTPATIENT
Start: 2025-03-05 | End: 2025-03-05 | Stop reason: SDUPTHER

## 2025-03-05 RX ORDER — FENTANYL CITRATE 50 UG/ML
INJECTION, SOLUTION INTRAMUSCULAR; INTRAVENOUS
Status: DISCONTINUED | OUTPATIENT
Start: 2025-03-05 | End: 2025-03-05 | Stop reason: SDUPTHER

## 2025-03-05 RX ORDER — DEXAMETHASONE SODIUM PHOSPHATE 4 MG/ML
4 INJECTION, SOLUTION INTRA-ARTICULAR; INTRALESIONAL; INTRAMUSCULAR; INTRAVENOUS; SOFT TISSUE EVERY 6 HOURS
Status: DISCONTINUED | OUTPATIENT
Start: 2025-03-05 | End: 2025-03-06 | Stop reason: HOSPADM

## 2025-03-05 RX ORDER — PROPOFOL 10 MG/ML
INJECTION, EMULSION INTRAVENOUS
Status: DISCONTINUED | OUTPATIENT
Start: 2025-03-05 | End: 2025-03-05 | Stop reason: SDUPTHER

## 2025-03-05 RX ORDER — SODIUM CHLORIDE 9 MG/ML
INJECTION, SOLUTION INTRAVENOUS PRN
Status: DISCONTINUED | OUTPATIENT
Start: 2025-03-05 | End: 2025-03-06 | Stop reason: HOSPADM

## 2025-03-05 RX ORDER — SODIUM CHLORIDE 9 MG/ML
INJECTION, SOLUTION INTRAVENOUS
Status: DISCONTINUED | OUTPATIENT
Start: 2025-03-05 | End: 2025-03-05 | Stop reason: SDUPTHER

## 2025-03-05 RX ADMIN — LIDOCAINE HYDROCHLORIDE 60 MG: 20 INJECTION, SOLUTION INTRAVENOUS at 06:22

## 2025-03-05 RX ADMIN — FENTANYL CITRATE 50 MCG: 0.05 INJECTION, SOLUTION INTRAMUSCULAR; INTRAVENOUS at 07:00

## 2025-03-05 RX ADMIN — DIAZEPAM 5 MG: 5 TABLET ORAL at 13:47

## 2025-03-05 RX ADMIN — ONDANSETRON 4 MG: 2 INJECTION, SOLUTION INTRAMUSCULAR; INTRAVENOUS at 08:12

## 2025-03-05 RX ADMIN — SODIUM CHLORIDE: 9 INJECTION, SOLUTION INTRAVENOUS at 06:10

## 2025-03-05 RX ADMIN — ACETAMINOPHEN 650 MG: 325 TABLET ORAL at 17:17

## 2025-03-05 RX ADMIN — DIAZEPAM 5 MG: 5 TABLET ORAL at 19:34

## 2025-03-05 RX ADMIN — SODIUM CHLORIDE, PRESERVATIVE FREE 10 ML: 5 INJECTION INTRAVENOUS at 13:29

## 2025-03-05 RX ADMIN — SENNOSIDES AND DOCUSATE SODIUM 1 TABLET: 50; 8.6 TABLET ORAL at 21:20

## 2025-03-05 RX ADMIN — BENZOCAINE AND MENTHOL 1 LOZENGE: 15; 3.6 LOZENGE ORAL at 23:56

## 2025-03-05 RX ADMIN — SODIUM CHLORIDE: 0.9 INJECTION, SOLUTION INTRAVENOUS at 05:47

## 2025-03-05 RX ADMIN — OXYCODONE HYDROCHLORIDE 10 MG: 10 TABLET ORAL at 13:41

## 2025-03-05 RX ADMIN — INSULIN LISPRO 2 UNITS: 100 INJECTION, SOLUTION INTRAVENOUS; SUBCUTANEOUS at 17:18

## 2025-03-05 RX ADMIN — DEXAMETHASONE SODIUM PHOSPHATE 4 MG: 4 INJECTION INTRA-ARTICULAR; INTRALESIONAL; INTRAMUSCULAR; INTRAVENOUS; SOFT TISSUE at 23:36

## 2025-03-05 RX ADMIN — CEFAZOLIN 2000 MG: 2 INJECTION, POWDER, FOR SOLUTION INTRAMUSCULAR; INTRAVENOUS at 06:35

## 2025-03-05 RX ADMIN — WATER 2000 MG: 1 INJECTION INTRAMUSCULAR; INTRAVENOUS; SUBCUTANEOUS at 17:17

## 2025-03-05 RX ADMIN — WATER 2000 MG: 1 INJECTION INTRAMUSCULAR; INTRAVENOUS; SUBCUTANEOUS at 23:35

## 2025-03-05 RX ADMIN — METFORMIN HYDROCHLORIDE 850 MG: 850 TABLET ORAL at 17:20

## 2025-03-05 RX ADMIN — OXYCODONE HYDROCHLORIDE 10 MG: 10 TABLET ORAL at 17:31

## 2025-03-05 RX ADMIN — HYDROMORPHONE HYDROCHLORIDE 0.5 MG: 1 INJECTION, SOLUTION INTRAMUSCULAR; INTRAVENOUS; SUBCUTANEOUS at 09:37

## 2025-03-05 RX ADMIN — BISACODYL 5 MG: 5 TABLET, COATED ORAL at 13:27

## 2025-03-05 RX ADMIN — ATORVASTATIN CALCIUM 20 MG: 20 TABLET, FILM COATED ORAL at 13:27

## 2025-03-05 RX ADMIN — SODIUM CHLORIDE, PRESERVATIVE FREE 10 ML: 5 INJECTION INTRAVENOUS at 21:22

## 2025-03-05 RX ADMIN — ACETAMINOPHEN 650 MG: 325 TABLET ORAL at 13:26

## 2025-03-05 RX ADMIN — FENTANYL CITRATE 50 MCG: 0.05 INJECTION, SOLUTION INTRAMUSCULAR; INTRAVENOUS at 06:22

## 2025-03-05 RX ADMIN — GABAPENTIN 300 MG: 300 CAPSULE ORAL at 17:18

## 2025-03-05 RX ADMIN — DOCUSATE SODIUM 200 MG: 100 CAPSULE, LIQUID FILLED ORAL at 13:26

## 2025-03-05 RX ADMIN — PROPOFOL 200 MG: 10 INJECTION, EMULSION INTRAVENOUS at 06:22

## 2025-03-05 RX ADMIN — SODIUM CHLORIDE: 9 INJECTION, SOLUTION INTRAVENOUS at 07:12

## 2025-03-05 RX ADMIN — ROCURONIUM BROMIDE 50 MG: 50 INJECTION INTRAVENOUS at 06:22

## 2025-03-05 RX ADMIN — INSULIN LISPRO 4 UNITS: 100 INJECTION, SOLUTION INTRAVENOUS; SUBCUTANEOUS at 21:22

## 2025-03-05 RX ADMIN — GABAPENTIN 300 MG: 300 CAPSULE ORAL at 21:21

## 2025-03-05 RX ADMIN — DEXAMETHASONE SODIUM PHOSPHATE 10 MG: 10 INJECTION INTRAMUSCULAR; INTRAVENOUS at 06:26

## 2025-03-05 RX ADMIN — FENTANYL CITRATE 50 MCG: 0.05 INJECTION, SOLUTION INTRAMUSCULAR; INTRAVENOUS at 07:25

## 2025-03-05 RX ADMIN — OXYCODONE HYDROCHLORIDE 10 MG: 10 TABLET ORAL at 21:21

## 2025-03-05 RX ADMIN — FENTANYL CITRATE 50 MCG: 0.05 INJECTION, SOLUTION INTRAMUSCULAR; INTRAVENOUS at 07:52

## 2025-03-05 RX ADMIN — FENOFIBRATE 160 MG: 160 TABLET ORAL at 13:28

## 2025-03-05 RX ADMIN — SENNOSIDES AND DOCUSATE SODIUM 1 TABLET: 50; 8.6 TABLET ORAL at 13:27

## 2025-03-05 RX ADMIN — METFORMIN HYDROCHLORIDE 850 MG: 850 TABLET ORAL at 13:28

## 2025-03-05 RX ADMIN — HYDROMORPHONE HYDROCHLORIDE 0.5 MG: 1 INJECTION, SOLUTION INTRAMUSCULAR; INTRAVENOUS; SUBCUTANEOUS at 09:05

## 2025-03-05 RX ADMIN — CITALOPRAM HYDROBROMIDE 40 MG: 20 TABLET ORAL at 13:28

## 2025-03-05 RX ADMIN — TIZANIDINE 4 MG: 4 TABLET ORAL at 21:22

## 2025-03-05 RX ADMIN — DEXAMETHASONE SODIUM PHOSPHATE 4 MG: 4 INJECTION INTRA-ARTICULAR; INTRALESIONAL; INTRAMUSCULAR; INTRAVENOUS; SOFT TISSUE at 17:18

## 2025-03-05 RX ADMIN — INSULIN LISPRO 2 UNITS: 100 INJECTION, SOLUTION INTRAVENOUS; SUBCUTANEOUS at 13:43

## 2025-03-05 RX ADMIN — POLYETHYLENE GLYCOL 3350 17 G: 17 POWDER, FOR SOLUTION ORAL at 13:27

## 2025-03-05 RX ADMIN — DOCUSATE SODIUM 200 MG: 100 CAPSULE, LIQUID FILLED ORAL at 21:20

## 2025-03-05 RX ADMIN — BENZOCAINE AND MENTHOL 1 LOZENGE: 15; 3.6 LOZENGE ORAL at 17:36

## 2025-03-05 RX ADMIN — FENTANYL CITRATE 50 MCG: 0.05 INJECTION, SOLUTION INTRAMUSCULAR; INTRAVENOUS at 08:26

## 2025-03-05 RX ADMIN — ACETAMINOPHEN 650 MG: 325 TABLET ORAL at 23:36

## 2025-03-05 RX ADMIN — DEXAMETHASONE SODIUM PHOSPHATE 4 MG: 4 INJECTION INTRA-ARTICULAR; INTRALESIONAL; INTRAMUSCULAR; INTRAVENOUS; SOFT TISSUE at 13:27

## 2025-03-05 RX ADMIN — MIDAZOLAM 2 MG: 1 INJECTION INTRAMUSCULAR; INTRAVENOUS at 06:11

## 2025-03-05 RX ADMIN — SUGAMMADEX 181 MG: 100 INJECTION, SOLUTION INTRAVENOUS at 08:20

## 2025-03-05 RX ADMIN — GABAPENTIN 300 MG: 300 CAPSULE ORAL at 13:26

## 2025-03-05 ASSESSMENT — PAIN DESCRIPTION - DESCRIPTORS
DESCRIPTORS: ACHING;DISCOMFORT
DESCRIPTORS: ACHING;SORE;TIGHTNESS
DESCRIPTORS: ACHING
DESCRIPTORS: ACHING;DISCOMFORT

## 2025-03-05 ASSESSMENT — PAIN SCALES - GENERAL
PAINLEVEL_OUTOF10: 7
PAINLEVEL_OUTOF10: 7
PAINLEVEL_OUTOF10: 4
PAINLEVEL_OUTOF10: 7
PAINLEVEL_OUTOF10: 4
PAINLEVEL_OUTOF10: 7

## 2025-03-05 ASSESSMENT — PAIN DESCRIPTION - FREQUENCY: FREQUENCY: CONTINUOUS

## 2025-03-05 ASSESSMENT — PAIN DESCRIPTION - DIRECTION: RADIATING_TOWARDS: RIGHT SHOULDER

## 2025-03-05 ASSESSMENT — PAIN DESCRIPTION - ORIENTATION
ORIENTATION: ANTERIOR;RIGHT
ORIENTATION: ANTERIOR

## 2025-03-05 ASSESSMENT — PAIN DESCRIPTION - ONSET: ONSET: ON-GOING

## 2025-03-05 ASSESSMENT — PAIN DESCRIPTION - LOCATION
LOCATION: NECK;SHOULDER
LOCATION: NECK

## 2025-03-05 ASSESSMENT — PAIN - FUNCTIONAL ASSESSMENT
PAIN_FUNCTIONAL_ASSESSMENT: PREVENTS OR INTERFERES SOME ACTIVE ACTIVITIES AND ADLS
PAIN_FUNCTIONAL_ASSESSMENT: 0-10
PAIN_FUNCTIONAL_ASSESSMENT: ADULT NONVERBAL PAIN SCALE (NPVS)
PAIN_FUNCTIONAL_ASSESSMENT: ACTIVITIES ARE NOT PREVENTED
PAIN_FUNCTIONAL_ASSESSMENT: PREVENTS OR INTERFERES SOME ACTIVE ACTIVITIES AND ADLS

## 2025-03-05 ASSESSMENT — PAIN SCALES - WONG BAKER: WONGBAKER_NUMERICALRESPONSE: NO HURT

## 2025-03-05 ASSESSMENT — PAIN DESCRIPTION - PAIN TYPE: TYPE: SURGICAL PAIN

## 2025-03-05 NOTE — PROGRESS NOTES
Physical Therapy  Physical Therapy Initial Assessment     Name: Selvin Bai Jr.  : 1975  MRN: [id number]      Date of Service: 3/5/2025    Evaluating PT:  Rica Jara, PT, DPT, [id number]    Room #:  5214/5214-A  Diagnosis:  Displacement of cervical intervertebral disc [M50.20]  Cervical herniated disc [M50.20]  PMHx/PSHx:    Past Medical History:   Diagnosis Date    Diabetes mellitus (HCC)       Past Surgical History:   Procedure Laterality Date    APPENDECTOMY      HERNIA REPAIR        Procedure/Surgery:  C5-C6 and C6-C7 anterior cervical discectomy and fusion 3/5  Precautions:  Fall risk, Spinal Precautions, Cervical Collar  Equipment Needs:  TBD    SUBJECTIVE:    Pt lives with his wife in a 2 story home with 4 stairs to enter and 0 rail.  Bed is on 2 floor with 1 flight and 0 rail to access and bath is on 1 floor.  Pt ambulated with no AD PTA.    OBJECTIVE:   Initial Evaluation  Date: 3/5/25 Treatment Short Term/ Long Term   Goals   AM-PAC 6 Clicks      Was pt agreeable to Eval/treatment? yes     Does pt have pain? 6-7/10 neck and shoulder pain      Bed Mobility  Rolling: Andi  Supine to sit: NT  Sit to supine: Andi  Scooting: SBA  Rolling: Independent   Supine to sit: Independent   Sit to supine: Independent   Scooting: Independent    Transfers Sit to stand: Andi  Stand to sit: Andi  Stand pivot: Andi   Sit to stand: Independent   Stand to sit: Independent   Stand pivot: mod Independent with AAD prn   Ambulation    75'x2 with HHA Andi  400+ feet with AAD prn mod Independent    Stair negotiation: ascended and descended 10 steps with 1 rail Andi  10 +steps with 1 rail mod Independent    ROM BUE:  Refer to OT note  BLE:  WFL     Strength BUE:  Refer to OT note  BLE:  WFL     Balance Sitting EOB:  Sup  Dynamic Standing:  Andi HHA  Sitting EOB:  Independent   Dynamic Standing:  mod Independent with AAD     Pt is A & O x 4  Sensation:  Pt reports numbness and tingling to R hand  Edema:  unremarkable

## 2025-03-05 NOTE — DISCHARGE INSTRUCTIONS
Discharge Instructions    1. No lifting more than 10 pounds.  2. C-spine collar to be worn at all times.  3. All stitches are under the skin and will dissolve in time.  4. Leave incision open to air.  5. Patient may shower, do not soak or scrub at the incision site.  6. Refrain from driving or sexual activity for 1 month.   7. Follow-up with Dr. Coronel in the office in 1 month with cervical x-rays.       Cervical Spinal Fusion: What to Expect at Home  Your Recovery     Cervical spinal fusion is surgery that joins two or more of the vertebrae in your neck. It made your neck more stable.  After surgery, you can expect your neck to feel stiff and sore. This should improve in the weeks after surgery. You may have trouble sitting or standing in one position for very long.  You may need to wear a neck brace for a while. Most people can go back to work after 4 to 6 weeks. But it may take a few months to get back to your usual activities. How long it takes depends on what kind of surgery you had and the type of work and other activities you do.  Your doctor may advise you to work with a physical therapist to strengthen the muscles around your neck and back.  This care sheet gives you a general idea about how long it will take for you to recover. But each person recovers at a different pace. Follow the steps below to get better as quickly as possible.  How can you care for yourself at home?  Activity    Rest when you feel tired. Getting enough sleep will help you recover.     Try to walk each day. Start by walking a little more than you did the day before. Bit by bit, increase the amount you walk. Walking boosts blood flow and helps prevent pneumonia and constipation. Walking may also decrease your muscle soreness after surgery.     Follow your doctor's directions about not lifting anything that would strain your neck and back. This may include a child, heavy grocery bags and milk containers, a heavy briefcase or backpack,  cat litter or dog food bags, or a vacuum .     Avoid strenuous activities, such as bicycle riding, jogging, weightlifting, or aerobic exercise, until your doctor says it is okay.     Do not drive for 2 to 4 weeks after your surgery or until your doctor says it is okay.     Avoid taking long car trips for 2 to 4 weeks after surgery. Your neck may become tired and painful from sitting too long in one position.     You will probably need to take 4 to 6 weeks off from work. It depends on the type of work you do and how you feel.     You may have sex as soon as you feel able, but avoid positions that put stress on your neck or cause pain.   Diet    You can eat your normal diet. If your stomach is upset, try bland, low-fat foods like plain rice, broiled chicken, toast, and yogurt.     Drink plenty of fluids. If you have kidney, heart, or liver disease and have to limit fluids, talk with your doctor before you increase the amount of fluids you drink.     You may notice that your bowel movements are not regular right after your surgery. This is common. Try to avoid constipation and straining with bowel movements. You may want to take a fiber supplement every day. If you have not had a bowel movement after a couple of days, ask your doctor about taking a mild laxative.   Medicines    Your doctor will tell you if and when you can restart your medicines. The doctor will also give you instructions about taking any new medicines.     If you stopped taking aspirin or some other blood thinner, your doctor will tell you when to start taking it again.     Be safe with medicines. Take pain medicines exactly as directed.  If the doctor gave you a prescription medicine for pain, take it as prescribed.  If you are not taking a prescription pain medicine, ask your doctor if you can take an over-the-counter medicine.     If you think your pain pill is making you sick to your stomach:  Take your pills after meals (unless your doctor

## 2025-03-05 NOTE — PROGRESS NOTES
Per [facility] approved formulary policy.     SGLT2's are only formulary with the indication of CKD or CHF therefore:    Please note that the  Empagliflozin (Jardiance) is non-formulary with indications of type 2 diabetes and has been discontinued while inpatient.    Please contact the pharmacy with any questions or concerns.  Thank you.  Mandi Oglesby RPH, [place]/PharmD 3/5/2025 10:19 AM

## 2025-03-05 NOTE — BRIEF OP NOTE
Brief Postoperative Note      Patient: Selvin Bai Jr.  YOB: 1975  MRN: [id number]    Date of Procedure: 3/5/2025    Pre-Op Diagnosis Codes:      * Displacement of cervical intervertebral disc [M50.20]    Post-Op Diagnosis: Same       Procedure(s):  C5-C6 and C6-C7 anterior cervical discectomy and fusion    Surgeon(s):  Mayela Coronel MD    Assistant:  Resident: Diogenes Segura DO    Anesthesia: General    Estimated Blood Loss (mL): Minimal    Complications: None    Specimens:   ID Type Source Tests Collected by Time Destination   A : cervical disc Tissue Spine SURGICAL PATHOLOGY Mayela Coronel MD 3/5/2025 0707        Implants:  Implant Name Type Inv. Item Serial No.  Lot No. LRB No. Used Action   GRAFT BNE SUB [id number] CANC JULIA ANT CERV INTBDY FUS - [id number]  GRAFT BNE SUB [id number] CANC JULIA ANT CERV INTBDY FUS [id number] [employer]  Right 1 Implanted   GRAFT BNE SUB [id number] CANC JULIA ANT CERV INTBDY FUS - [id number]  GRAFT BNE SUB [id number] CANC JULIA ANT CERV INTBDY FUS [id number] [employer]  Right 1 Implanted   PLATE SPNL L30MM ANT CERV TI ALLY LEV 2 XTEND - [id number]  PLATE SPNL L30MM ANT CERV TI ALLY LEV 2 XTEND  [employer]  Right 1 Implanted   SCREW SPNL L16MM DIA4.2MM ANT CERV TI ALLY SELF DRL MORRIS ANG - [id number]  SCREW SPNL L16MM DIA4.2MM ANT CERV TI ALLY SELF DRL MORRIS ANG  [employer]  Right 6 Implanted         Drains: * No LDAs found *    Findings:  Infection Present At Time Of Surgery (PATOS) (choose all levels that have infection present):  No infection present  Other Findings: see dictated op note    Electronically signed by Mayela Coronel MD on 3/5/2025 at 8:19 AM

## 2025-03-05 NOTE — PROGRESS NOTES
4 Eyes Skin Assessment     NAME:  Selvin Bai Jr.  YOB: 1975  MEDICAL RECORD NUMBER:  [id number]    The patient is being assessed for  Admission    I agree that at least one RN has performed a thorough Head to Toe Skin Assessment on the patient. ALL assessment sites listed below have been assessed.      Areas assessed by both nurses:    Head, Face, Ears, Shoulders, Back, Chest, Arms, Elbows, Hands, Sacrum. Buttock, Coccyx, Ischium, and Legs. Feet and Heels        Surgical incision ACDF  dressing CDI,CCollar on and aligned  Does the Patient have a Wound? No noted wound(s)       Rolly Prevention initiated by RN: No  Wound Care Orders initiated by RN: No    Pressure Injury (Stage 3,4, Unstageable, DTI, NWPT, and Complex wounds) if present, place Wound referral order by RN under : No    New Ostomies, if present place, Ostomy referral order under : No     Nurse 1 eSignature: Electronically signed by Ashanti Winter RN on 3/5/25 at 1:09 PM EST    **SHARE this note so that the co-signing nurse can place an eSignature**    Nurse 2 eSignature: {Esignature:[id number]}

## 2025-03-05 NOTE — CONSULTS
Hospitalist Consult History & Physical        Reason for Consult: Medical management    Date of Service: Pt seen/examined in consultation on 3/5/2025    History of Present Illness:    Mr. Selvin Bai Jr., a 49 y.o. year old male  who  has a past medical history of Diabetes mellitus (HCC).     Patient admitted by neurosurgery on 3/5 for scheduled C5-C6 and C6-C7 ACDF.  Our service is consulted postoperatively for medical management.  Our service is consulted postoperatively for medical management.  Patient's pertinent past medical history includes diabetes mellitus and hyperlipidemia.    Postoperatively vitals are stable.  Preoperative lab work from 2/28/2025 reviewed and largely unremarkable. Patient reports he is uncomfortable, but but otherwise feeling okay.  He denies any acute complaints including chest pain, palpitations, shortness of breath, cough, N/V/D, fevers or chills.  He confirms his only known past medical history is diabetes, hyperlipidemia, and back problems.  He is well-controlled on home medications.      Past Medical History:        Diagnosis Date    Diabetes mellitus (HCC)        Past Surgical History:        Procedure Laterality Date    APPENDECTOMY      HERNIA REPAIR         Medications Prior to Admission:    Prior to Admission medications    Medication Sig Start Date End Date Taking? Authorizing Provider   oxyCODONE (ROXICODONE) 5 MG immediate release tablet Take 1 tablet by mouth every 4 hours as needed for Pain for up to 7 days. Intended supply: 7 days. Take lowest dose possible to manage pain Max Daily Amount: 30 mg 3/5/25 3/12/25 Yes Nathaniel Mercado PA   tiZANidine (ZANAFLEX) 4 MG tablet Take 1 tablet by mouth every 6 hours as needed (muscle spasm) 3/5/25  Yes Nathaniel Mercado PA   citalopram (CELEXA) 40 MG tablet Take 1 tablet by mouth daily 11/17/24  Yes Provider, MD Sawyer   atorvastatin (LIPITOR) 20 MG tablet Take 1 tablet by mouth daily 10/10/24  Yes Provider

## 2025-03-05 NOTE — PROGRESS NOTES
OCCUPATIONAL THERAPY INITIAL EVALUATION    [facility]  [address] [place], [place]        Date:3/5/2025                                                  Patient Name: Selvin Bai Jr.    MRN: [id number]    : 1975    Room: [address] [place] [address]      Evaluating OT: Nicolasa Crane OTR/L; [id number]       Referring Provider: Mayela Coronel MD     Specific Provider Orders/Date: OT Eval and Treat 25 1030       Diagnosis: Displacement of cervical intervertebral disc;  Cervical herniated disc; Mixed hyperlipidemia; Type 2 diabetes mellitus without complication, without long-term current use of insulin.    Surgery: 3/5/25 C5-C6 and C6-C7 anterior cervical discectomy and fusion.     Pertinent Medical History:  has a past medical history of Diabetes mellitus (HCC).     Recommended Adaptive Equipment: extended tub bench, AE for LE bathing and dressing PRN     Precautions:  Fall Risk, +alarms, c-spine precautions, c-collar, easy to chew diet     Assessment of current deficits    [x] Functional mobility  [x]ADLs  [x] Strength               []Cognition    [x] Functional transfers   [x] IADLs         [x] Safety Awareness   [x]Endurance    [] Fine Coordination              [x] Balance      [] Vision/perception   []Sensation     []Gross Motor Coordination  [] ROM  [] Delirium                   [] Motor Control     OT PLAN OF CARE   OT POC based on physician orders, patient diagnosis and results of clinical assessment    Frequency/Duration 1-3 days/wk for 2 weeks PRN   Specific OT Treatment Interventions to include:   * Instruction/training on adapted ADL techniques and AE recommendations to increase functional independence within precautions       * Training on energy conservation strategies, correct breathing pattern and techniques to improve independence/tolerance for self-care routine  * Functional transfer/mobility training/DME recommendations for increased  awareness, implementation of fall prevention strategies, & functional engagement throughout daily activities maintaining c-spine precautions. Pt would benefit from continued skilled OT to increase safety and independence with completion of ADL/IADL tasks for functional independence and quality of life.    Treatment: OT treatment provided this date includes:   ADL-  Instruction/training on safety and adapted techniques for completion of ADLs: Therapist facilitated & pt educated on c-spine precautions & activity modifications/adaptations to promote implementation of fall prevention strategies, EC/WS strategies, & safety awareness throughout ADLs.   Mobility-  Instruction/training on safety and improved independence with bed mobility/functional transfers and functional mobility.   Sitting/Standing Balance/Tolerance: to increase balance, core/pelvic stability, and activity tolerance during ADLs and facilitate proper posture and positioning. Pt seated EOB ~8 mins.   Activity tolerance- Instruction/training on energy conservation/work simplification & pain management strategies for completion of ADLs.   Skilled positioning/alignment-  Therapist facilitated proper positioning/alignment throughout session to maintain skin/joint integrity & proper body mechanics/c-spine precautions.    Rehab Potential: good  for established goals     LTG: maximize independence with ADLs to return to PLOF    Patient and/or family were instructed on functional diagnosis, prognosis/goals and OT plan of care. Demonstrated good understanding.     Eval Complexity: Low  History: Expanded chart review of medical records and additional review of physical, cognitive, or psychosocial history related to current functional performance  Exam: 3+ performance deficits  Assistance/Modification: Min/mod assistance or modifications required to perform tasks. May have comorbidities that affect occupational performance.    Time In: 12:40p  Time Out: 1:03p  Total

## 2025-03-05 NOTE — ANESTHESIA POSTPROCEDURE EVALUATION
Department of Anesthesiology  Postprocedure Note    Patient: Selvin Bai Jr.  MRN: [id number]  YOB: 1975  Date of evaluation: 3/5/2025    Procedure Summary       Date: 03/05/25 Room / Location: [address]    Anesthesia Start: 0610 Anesthesia Stop:     Procedure: C5-C6 and C6-C7 anterior cervical discectomy and fusion (Right: Neck) Diagnosis:       Displacement of cervical intervertebral disc      (Displacement of cervical intervertebral disc [M50.20])    Surgeons: Mayela Coronel MD Responsible Provider: Karthik Menon MD    Anesthesia Type: general ASA Status: 2            Anesthesia Type: No value filed.    Brayan Phase I: Brayan Score: 10    Brayan Phase II:      Anesthesia Post Evaluation    Patient location during evaluation: PACU  Patient participation: complete - patient participated  Level of consciousness: awake  Pain score: 3  Airway patency: patent  Nausea & Vomiting: no nausea and no vomiting  Cardiovascular status: blood pressure returned to baseline  Respiratory status: acceptable  Hydration status: euvolemic    No notable events documented.

## 2025-03-05 NOTE — H&P
I have examined the patient and reviewed the H and P and no changes are noted.  Right arm is worse as always    Mayela Coronel MD

## 2025-03-06 VITALS
HEIGHT: 69 IN | HEART RATE: 87 BPM | RESPIRATION RATE: 18 BRPM | BODY MASS INDEX: 29.62 KG/M2 | OXYGEN SATURATION: 97 % | TEMPERATURE: 97.8 F | DIASTOLIC BLOOD PRESSURE: 76 MMHG | WEIGHT: 200 LBS | SYSTOLIC BLOOD PRESSURE: 116 MMHG

## 2025-03-06 LAB
ANION GAP SERPL CALCULATED.3IONS-SCNC: 15 MMOL/L (ref 7–16)
BUN SERPL-MCNC: 25 MG/DL (ref 6–20)
CALCIUM SERPL-MCNC: 9.1 MG/DL (ref 8.6–10.2)
CHLORIDE SERPL-SCNC: 97 MMOL/L (ref 98–107)
CO2 SERPL-SCNC: 22 MMOL/L (ref 22–29)
CREAT SERPL-MCNC: 1.2 MG/DL (ref 0.7–1.2)
ERYTHROCYTE [DISTWIDTH] IN BLOOD BY AUTOMATED COUNT: 12.6 % (ref 11.5–15)
GFR, ESTIMATED: 73 ML/MIN/1.73M2
GLUCOSE BLD-MCNC: 223 MG/DL (ref 74–99)
GLUCOSE BLD-MCNC: 295 MG/DL (ref 74–99)
GLUCOSE SERPL-MCNC: 245 MG/DL (ref 74–99)
HCT VFR BLD AUTO: 42.4 % (ref 37–54)
HGB BLD-MCNC: 14 G/DL (ref 12.5–16.5)
MCH RBC QN AUTO: 29.7 PG (ref 26–35)
MCHC RBC AUTO-ENTMCNC: 33 G/DL (ref 32–34.5)
MCV RBC AUTO: 90 FL (ref 80–99.9)
PLATELET # BLD AUTO: 281 K/UL (ref 130–450)
PMV BLD AUTO: 10.6 FL (ref 7–12)
POTASSIUM SERPL-SCNC: 4.9 MMOL/L (ref 3.5–5)
RBC # BLD AUTO: 4.71 M/UL (ref 3.8–5.8)
SODIUM SERPL-SCNC: 134 MMOL/L (ref 132–146)
WBC OTHER # BLD: 20.7 K/UL (ref 4.5–11.5)

## 2025-03-06 PROCEDURE — 6370000000 HC RX 637 (ALT 250 FOR IP): Performed by: NEUROLOGICAL SURGERY

## 2025-03-06 PROCEDURE — 96374 THER/PROPH/DIAG INJ IV PUSH: CPT

## 2025-03-06 PROCEDURE — G0378 HOSPITAL OBSERVATION PER HR: HCPCS

## 2025-03-06 PROCEDURE — 82962 GLUCOSE BLOOD TEST: CPT

## 2025-03-06 PROCEDURE — 2500000003 HC RX 250 WO HCPCS: Performed by: NEUROLOGICAL SURGERY

## 2025-03-06 PROCEDURE — 6360000002 HC RX W HCPCS: Performed by: NEUROLOGICAL SURGERY

## 2025-03-06 PROCEDURE — 80048 BASIC METABOLIC PNL TOTAL CA: CPT

## 2025-03-06 PROCEDURE — 96376 TX/PRO/DX INJ SAME DRUG ADON: CPT

## 2025-03-06 PROCEDURE — 99232 SBSQ HOSP IP/OBS MODERATE 35: CPT

## 2025-03-06 PROCEDURE — 36415 COLL VENOUS BLD VENIPUNCTURE: CPT

## 2025-03-06 PROCEDURE — 85027 COMPLETE CBC AUTOMATED: CPT

## 2025-03-06 RX ORDER — OXYCODONE HYDROCHLORIDE 5 MG/1
5 TABLET ORAL EVERY 4 HOURS PRN
Qty: 42 TABLET | Refills: 0 | Status: SHIPPED | OUTPATIENT
Start: 2025-03-06 | End: 2025-03-13

## 2025-03-06 RX ADMIN — DIAZEPAM 5 MG: 5 TABLET ORAL at 09:03

## 2025-03-06 RX ADMIN — ACETAMINOPHEN 650 MG: 325 TABLET ORAL at 06:30

## 2025-03-06 RX ADMIN — BISACODYL 5 MG: 5 TABLET, COATED ORAL at 08:10

## 2025-03-06 RX ADMIN — OXYCODONE HYDROCHLORIDE 10 MG: 10 TABLET ORAL at 12:18

## 2025-03-06 RX ADMIN — BENZOCAINE AND MENTHOL 1 LOZENGE: 15; 3.6 LOZENGE ORAL at 09:03

## 2025-03-06 RX ADMIN — POLYETHYLENE GLYCOL 3350 17 G: 17 POWDER, FOR SOLUTION ORAL at 08:11

## 2025-03-06 RX ADMIN — DEXAMETHASONE SODIUM PHOSPHATE 4 MG: 4 INJECTION INTRA-ARTICULAR; INTRALESIONAL; INTRAMUSCULAR; INTRAVENOUS; SOFT TISSUE at 12:17

## 2025-03-06 RX ADMIN — WATER 2000 MG: 1 INJECTION INTRAMUSCULAR; INTRAVENOUS; SUBCUTANEOUS at 06:31

## 2025-03-06 RX ADMIN — SODIUM CHLORIDE, PRESERVATIVE FREE 10 ML: 5 INJECTION INTRAVENOUS at 12:19

## 2025-03-06 RX ADMIN — CITALOPRAM HYDROBROMIDE 40 MG: 20 TABLET ORAL at 08:12

## 2025-03-06 RX ADMIN — BENZOCAINE AND MENTHOL 1 LOZENGE: 15; 3.6 LOZENGE ORAL at 12:17

## 2025-03-06 RX ADMIN — OXYCODONE HYDROCHLORIDE 10 MG: 10 TABLET ORAL at 06:30

## 2025-03-06 RX ADMIN — SENNOSIDES AND DOCUSATE SODIUM 1 TABLET: 50; 8.6 TABLET ORAL at 08:12

## 2025-03-06 RX ADMIN — DOCUSATE SODIUM 200 MG: 100 CAPSULE, LIQUID FILLED ORAL at 08:10

## 2025-03-06 RX ADMIN — WATER 2000 MG: 1 INJECTION INTRAMUSCULAR; INTRAVENOUS; SUBCUTANEOUS at 13:21

## 2025-03-06 RX ADMIN — DEXAMETHASONE SODIUM PHOSPHATE 4 MG: 4 INJECTION INTRA-ARTICULAR; INTRALESIONAL; INTRAMUSCULAR; INTRAVENOUS; SOFT TISSUE at 06:31

## 2025-03-06 RX ADMIN — DIAZEPAM 5 MG: 5 TABLET ORAL at 01:57

## 2025-03-06 RX ADMIN — INSULIN LISPRO 4 UNITS: 100 INJECTION, SOLUTION INTRAVENOUS; SUBCUTANEOUS at 12:16

## 2025-03-06 RX ADMIN — ATORVASTATIN CALCIUM 20 MG: 20 TABLET, FILM COATED ORAL at 08:12

## 2025-03-06 RX ADMIN — FENOFIBRATE 160 MG: 160 TABLET ORAL at 08:11

## 2025-03-06 RX ADMIN — GABAPENTIN 300 MG: 300 CAPSULE ORAL at 08:10

## 2025-03-06 RX ADMIN — ACETAMINOPHEN 650 MG: 325 TABLET ORAL at 12:17

## 2025-03-06 RX ADMIN — OXYCODONE HYDROCHLORIDE 10 MG: 10 TABLET ORAL at 01:57

## 2025-03-06 RX ADMIN — BENZOCAINE AND MENTHOL 1 LOZENGE: 15; 3.6 LOZENGE ORAL at 13:41

## 2025-03-06 RX ADMIN — METFORMIN HYDROCHLORIDE 850 MG: 850 TABLET ORAL at 08:10

## 2025-03-06 RX ADMIN — GABAPENTIN 300 MG: 300 CAPSULE ORAL at 13:22

## 2025-03-06 RX ADMIN — INSULIN LISPRO 2 UNITS: 100 INJECTION, SOLUTION INTRAVENOUS; SUBCUTANEOUS at 07:02

## 2025-03-06 ASSESSMENT — PAIN DESCRIPTION - DIRECTION
RADIATING_TOWARDS: RIGHT SHOULDER
RADIATING_TOWARDS: RIGHT SHOULDER

## 2025-03-06 ASSESSMENT — PAIN DESCRIPTION - LOCATION
LOCATION: NECK
LOCATION: NECK;THROAT
LOCATION: NECK;THROAT

## 2025-03-06 ASSESSMENT — PAIN DESCRIPTION - DESCRIPTORS
DESCRIPTORS: ACHING;SORE;TIGHTNESS
DESCRIPTORS: ACHING;SORE;TIGHTNESS
DESCRIPTORS: ACHING;DISCOMFORT;SORE

## 2025-03-06 ASSESSMENT — PAIN DESCRIPTION - FREQUENCY
FREQUENCY: CONTINUOUS

## 2025-03-06 ASSESSMENT — PAIN DESCRIPTION - ONSET
ONSET: ON-GOING

## 2025-03-06 ASSESSMENT — PAIN DESCRIPTION - PAIN TYPE
TYPE: SURGICAL PAIN

## 2025-03-06 ASSESSMENT — PAIN SCALES - GENERAL
PAINLEVEL_OUTOF10: 5
PAINLEVEL_OUTOF10: 6
PAINLEVEL_OUTOF10: 5
PAINLEVEL_OUTOF10: 7
PAINLEVEL_OUTOF10: 4
PAINLEVEL_OUTOF10: 7

## 2025-03-06 ASSESSMENT — PAIN DESCRIPTION - ORIENTATION
ORIENTATION: ANTERIOR
ORIENTATION: ANTERIOR

## 2025-03-06 ASSESSMENT — PAIN - FUNCTIONAL ASSESSMENT
PAIN_FUNCTIONAL_ASSESSMENT: ACTIVITIES ARE NOT PREVENTED
PAIN_FUNCTIONAL_ASSESSMENT: PREVENTS OR INTERFERES SOME ACTIVE ACTIVITIES AND ADLS
PAIN_FUNCTIONAL_ASSESSMENT: PREVENTS OR INTERFERES SOME ACTIVE ACTIVITIES AND ADLS

## 2025-03-06 ASSESSMENT — PAIN SCALES - WONG BAKER: WONGBAKER_NUMERICALRESPONSE: NO HURT

## 2025-03-06 NOTE — PROGRESS NOTES
Reason for Consult: Medical management    Date of Service: Pt seen/examined in consultation on 3/6/2025    History of Present Illness:    Mr. Selvin Bai Jr., a 49 y.o. year old male  who  has a past medical history of Diabetes mellitus (HCC).     Patient admitted by neurosurgery on 3/5 for scheduled C5-C6 and C6-C7 ACDF.  Our service is consulted postoperatively for medical management.  Our service is consulted postoperatively for medical management.  Patient's pertinent past medical history includes diabetes mellitus and hyperlipidemia.    Postoperatively vitals are stable.  Preoperative lab work from 2/28/2025 reviewed and largely unremarkable. Patient reports he is uncomfortable, but but otherwise feeling okay.  He denies any acute complaints including chest pain, palpitations, shortness of breath, cough, N/V/D, fevers or chills.  He confirms his only known past medical history is diabetes, hyperlipidemia, and back problems.  He is well-controlled on home medications.      Past Medical History:        Diagnosis Date    Diabetes mellitus (HCC)        Past Surgical History:        Procedure Laterality Date    APPENDECTOMY      CERVICAL FUSION Right 3/5/2025    C5-C6 and C6-C7 anterior cervical discectomy and fusion performed by Mayela Coronel [place] [facility] [place]    HERNIA REPAIR         Medications Prior to Admission:    Prior to Admission medications    Medication Sig Start Date End Date Taking? Authorizing Provider   oxyCODONE (ROXICODONE) 5 MG immediate release tablet Take 1 tablet by mouth every 4 hours as needed for Pain for up to 7 days. Max Daily Amount: 30 mg 3/6/25 3/13/25 Yes Nathaniel Mercado PA   tiZANidine (ZANAFLEX) 4 MG tablet Take 1 tablet by mouth every 6 hours as needed (muscle spasm) 3/6/25  Yes Nathaniel Mercado PA   oxyCODONE (ROXICODONE) 5 MG immediate release tablet Take 1 tablet by mouth every 4 hours as needed for Pain for up to 7 days. Intended supply: 7 days. Take lowest dose possible  AM  Okay to d/c per neurosurgery    Chronic problems: DM2, hyperlipidemia  --Patient's home medications have already been reordered by attending with the exception of Ozempic.  -- ISS for better glycemic control while inpatient

## 2025-03-06 NOTE — DISCHARGE SUMMARY
Discharge Summary    Selvin Bai Jr.    INPATIENT HOSPITAL SUMMARY:                The patient is a 49 y.o. male who was admitted to the hospital on 3/5/2025  5:05 AM for treatment of neck pain.   On the day of admission, a cervical fusion was performed.  The patient's hospital course was uncomplicated and consisted of physical therapy, incision observation, and a return to normal oral intake.  The patient was discharged on 3/6/2025  2:07 PM tolerating a diet, moving bowels, and urinating without difficulty.  The incisions were clean and intact.  The patient was discharged to home in satisfactory condition with instructions to call the office for a follow up appointment.      Hospital Problem List:  Principal Problem:    Displacement of cervical intervertebral disc  Active Problems:    Cervical herniated disc    Mixed hyperlipidemia    Type 2 diabetes mellitus without complication, without long-term current use of insulin (HCC)  Resolved Problems:    * No resolved hospital problems. *     Procedure(s) (LRB):  C5-C6 and C6-C7 anterior cervical discectomy and fusion (Right)    Discharge Medications:      Medication List        START taking these medications      * oxyCODONE 5 MG immediate release tablet  Commonly known as: Roxicodone  Take 1 tablet by mouth every 4 hours as needed for Pain for up to 7 days. Intended supply: 7 days. Take lowest dose possible to manage pain Max Daily Amount: 30 mg     * oxyCODONE 5 MG immediate release tablet  Commonly known as: ROXICODONE  Take 1 tablet by mouth every 4 hours as needed for Pain for up to 7 days. Max Daily Amount: 30 mg           * This list has 2 medication(s) that are the same as other medications prescribed for you. Read the directions carefully, and ask your doctor or other care provider to review them with you.                CHANGE how you take these medications      * tiZANidine 4 MG tablet  Commonly known as: ZANAFLEX  Take 1 tablet by mouth every 6 hours  as needed (muscle spasm)  What changed:   when to take this  reasons to take this     * tiZANidine 4 MG tablet  Commonly known as: ZANAFLEX  Take 1 tablet by mouth every 6 hours as needed (muscle spasm)  What changed: You were already taking a medication with the same name, and this prescription was added. Make sure you understand how and when to take each.           * This list has 2 medication(s) that are the same as other medications prescribed for you. Read the directions carefully, and ask your doctor or other care provider to review them with you.                CONTINUE taking these medications      atorvastatin 20 MG tablet  Commonly known as: LIPITOR     citalopram 40 MG tablet  Commonly known as: CELEXA     fenofibrate 150 MG Caps capsule  Commonly known as: LIPOFEN     gabapentin 300 MG capsule  Commonly known as: NEURONTIN     Jardiance 10 MG tablet  Generic drug: empagliflozin     metFORMIN 850 MG tablet  Commonly known as: GLUCOPHAGE            STOP taking these medications      oxyCODONE-acetaminophen 5-325 MG per tablet  Commonly known as: PERCOCET            ASK your doctor about these medications      Ozempic (1 MG/DOSE) 4 MG/3ML Sopn sc injection  Generic drug: Semaglutide (1 MG/DOSE)               Where to Get Your Medications        These medications were sent to [facility] [place] [address] [address] - P [phone] - F [phone]  [address] [place], [place] [address] [id number]      Phone: [phone]   oxyCODONE 5 MG immediate release tablet  oxyCODONE 5 MG immediate release tablet  tiZANidine 4 MG tablet  tiZANidine 4 MG tablet         ADDY Pierre  3/6/2025

## 2025-03-06 NOTE — PROGRESS NOTES
CLINICAL PHARMACY NOTE: MEDS TO BEDS    Total # of Prescriptions Filled: 0   The following medications were delivered to the patient:      Additional Documentation:  Patient has pain med at home from 2/17. Per Lindsay MCCABE, pt to take med at home and reach out to Dr's office when due. Patient aware. Per patient, they also have muscle relaxer at home. They're aware that we have on hold in pharmacy if they need it. 3-6-25

## 2025-03-06 NOTE — PROGRESS NOTES
Department of Neurosurgery  Progress Note    CHIEF COMPLAINT: s/p ACDF    SUBJECTIVE:  amparo op site pain well    REVIEW OF SYSTEMS :  Constitutional: Negative for chills and fever.    Neurological: Negative for dizziness, tremors and speech change.     OBJECTIVE:   VITALS:  /78   Pulse 76   Temp 97.6 °F (36.4 °C) (Temporal)   Resp 16   Ht 1.753 m (5' 9\")   Wt 90.7 kg (200 lb)   SpO2 96%   BMI 29.53 kg/m²   PHYSICAL:  CONSTITUTIONAL:  awake, alert, cooperative, no apparent distress, and appears stated age    DATA:  CBC:   Lab Results   Component Value Date/Time    WBC 20.7 03/06/2025 04:17 AM    RBC 4.71 03/06/2025 04:17 AM    HGB 14.0 03/06/2025 04:17 AM    HCT 42.4 03/06/2025 04:17 AM    MCV 90.0 03/06/2025 04:17 AM    MCH 29.7 03/06/2025 04:17 AM    MCHC 33.0 03/06/2025 04:17 AM    RDW 12.6 03/06/2025 04:17 AM     03/06/2025 04:17 AM    MPV 10.6 03/06/2025 04:17 AM     BMP:    Lab Results   Component Value Date/Time     03/06/2025 04:17 AM    K 4.9 03/06/2025 04:17 AM    K 4.4 11/30/2022 12:31 PM    CL 97 03/06/2025 04:17 AM    CO2 22 03/06/2025 04:17 AM    BUN 25 03/06/2025 04:17 AM    CREATININE 1.2 03/06/2025 04:17 AM    CALCIUM 9.1 03/06/2025 04:17 AM    LABGLOM 73 03/06/2025 04:17 AM    LABGLOM >60 11/30/2022 12:31 PM    GLUCOSE 245 03/06/2025 04:17 AM     PT/INR:    Lab Results   Component Value Date/Time    PROTIME 10.7 02/28/2025 10:35 AM    INR 1.0 02/28/2025 10:35 AM     PTT:  No results found for: \"APTT\"[APTT}    Current Inpatient Medications  Current Facility-Administered Medications: atorvastatin (LIPITOR) tablet 20 mg, 20 mg, Oral, Daily  citalopram (CELEXA) tablet 40 mg, 40 mg, Oral, Daily  fenofibrate tablet 160 mg, 160 mg, Oral, Daily  gabapentin (NEURONTIN) capsule 300 mg, 300 mg, Oral, 4x Daily  metFORMIN (GLUCOPHAGE) tablet 850 mg, 850 mg, Oral, BID WC  tiZANidine (ZANAFLEX) tablet 4 mg, 4 mg, Oral, Nightly  sodium chloride flush 0.9 % injection 5-40 mL, 5-40 mL,

## 2025-03-06 NOTE — PLAN OF CARE
Problem: Discharge Planning  Goal: Discharge to home or other facility with appropriate resources  3/6/2025 1230 by Ashanti Winter RN  Outcome: Adequate for Discharge

## 2025-03-06 NOTE — OP NOTE
[facility]              [address] [place] [address]                            OPERATIVE REPORT      PATIENT NAME: ALANNA JARQUIN              : 1975  MED REC NO: [id number]                        ROOM: 5214  ACCOUNT NO: [id number]                       ADMIT DATE: 2025  PROVIDER: Mayela Coronel MD      DATE OF PROCEDURE:  2025    SURGEON:  Mayela Coronel MD    PREOPERATIVE DIAGNOSIS:  Cervical herniated disk at C5-C6 and C6-C7.    POSTOPERATIVE DIAGNOSIS:  Cervical herniated disk at C5-C6 and C6-C7.    PROCEDURES:    1. Anterior cervical diskectomy and fusion at C5-C6 and C6-C7 with use of Globus Forge structural machined allograft and Globus Xtend plate with 16 mm screws.  2. Use of intraoperative fluoroscopy, interpreted by myself, the surgeon.    ANESTHESIA:  Generalized endotracheal anesthesia.    ASSISTANT:  Jesusita Segura D.O.    COMPLICATIONS:  None.    ESTIMATED BLOOD LOSS:  Minimal.    SPECIMEN:  Disk.    OPERATIVE INDICATIONS:  The patient is a 49-year-old gentleman who presented to the office complaining of neck pain, radiating to his arms with numbness, tingling, and weakness.  He had an MRI that showed that he had herniated disks at C5-C6 and C6-C7.  After risks, benefits, and alternatives were discussed with the patient, it was determined that he would undergo the above-listed procedure.    DESCRIPTION OF PROCEDURE:  The patient was brought in to the operating room.  A time-out was performed where he was identified by his name, medical record number, and the operative procedure which he was brought to undergo.  Next, induction of generalized endotracheal anesthesia was then commenced.  Upon completion of induction of generalized endotracheal anesthesia, he received preoperative antibiotics.  He was positioned on the operating table with his head in a horseshoe and a shoulder roll between his shoulder blades.  Next, the anterior  uncovertebral joint to the left uncovertebral joint.  Once this was completed, I placed another #7 Globus Forge structural machined allograft and once this was completed, I then placed a globus Xtend plate across the anterior aspect of the spine, affixed the plate to the spine using 16 mm screws.  I used intraoperative fluoroscopy to inspect the construct, it appeared sound.  I then engaged the locking mechanism on the plate.  I then obtained adequate hemostasis with monopolar and bipolar cautery.  I irrigated the wound copiously with antibiotic-impregnated saline.  I then proceeded to close the wound in layers using 2-0 Vicryl for the platysma, 3-0 Vicryl for subcutaneous layer, and 4-0 Monocryl in a subcuticular fashion for the skin with Dermabond.  A dry sterile dressing was placed over this.  The patient was then subsequently extubated and transported to the postanesthesia care unit in stable condition.  There were no complications.  Counts were correct.  I was present for the entire case.          ZIA MARTINEZ MD      D:  03/05/2025 19:20:02     T:  03/05/2025 19:41:57     NICHOLAS/RAQUEL  Job #:  [id number]     Doc#:  [id number]

## 2025-03-06 NOTE — DISCHARGE INSTR - COC
Continuity of Care Form    Patient Name: Selvin Bai Jr.   :  1975  MRN:  [id number]    Admit date:  3/5/2025  Discharge date:  ***    Code Status Order: Full Code   Advance Directives:   Advance Care Flowsheet Documentation        Date/Time Healthcare Directive Type of Healthcare Directive Copy in Chart Healthcare Agent Appointed Healthcare Agent's Name Healthcare Agent's Phone Number    [phone] No, patient does not have an advance directive for healthcare treatment  --  --  --  --  --                     Admitting Physician:  Mayela Coronel MD  PCP: Jerald Galeano DO    Discharging Nurse: ***  Discharging Hospital Unit/Room#: 5214/5214-A  Discharging Unit Phone Number: ***    Emergency Contact:   Extended Emergency Contact Information  Primary Emergency Contact: Cecily Bai  Home Phone: [phone]  Mobile Phone: [phone]  Relation: Spouse   needed? No    Past Surgical History:  Past Surgical History:   Procedure Laterality Date    APPENDECTOMY      CERVICAL FUSION Right 3/5/2025    C5-C6 and C6-C7 anterior cervical discectomy and fusion performed by Mayela Coronel MD at [facility]    HERNIA REPAIR         Immunization History:     There is no immunization history on file for this patient.    Active Problems:  Patient Active Problem List   Diagnosis Code    Displacement of cervical intervertebral disc M50.20    Cervical herniated disc M50.20    Mixed hyperlipidemia E78.2    Type 2 diabetes mellitus without complication, without long-term current use of insulin (HCC) E11.9       Isolation/Infection:   Isolation            No Isolation          Patient Infection Status       None to display            Nurse Assessment:  Last Vital Signs: /78   Pulse 76   Temp 97.6 °F (36.4 °C) (Temporal)   Resp 16   Ht 1.753 m (5' 9\")   Wt 90.7 kg (200 lb)   SpO2 96%   BMI 29.53 kg/m²     Last documented pain score (0-10 scale): Pain Level: 5  Last Weight:   Wt Readings from Last 1

## 2025-03-06 NOTE — PLAN OF CARE
Problem: Chronic Conditions and Co-morbidities  Goal: Patient's chronic conditions and co-morbidity symptoms are monitored and maintained or improved  Outcome: Progressing     Problem: Discharge Planning  Goal: Discharge to home or other facility with appropriate resources  Outcome: Progressing     Problem: Pain  Goal: Verbalizes/displays adequate comfort level or baseline comfort level  Outcome: Progressing     Problem: Safety - Adult  Goal: Free from fall injury  Outcome: Progressing  Flowsheets (Taken 3/6/2025 2866)  Free From Fall Injury: Instruct family/caregiver on patient safety     Problem: ABCDS Injury Assessment  Goal: Absence of physical injury  Outcome: Progressing

## 2025-03-07 DIAGNOSIS — M50.20 DISPLACEMENT OF CERVICAL INTERVERTEBRAL DISC: Primary | ICD-10-CM

## 2025-03-10 LAB — SURGICAL PATHOLOGY REPORT: NORMAL

## 2025-04-01 ENCOUNTER — HOSPITAL ENCOUNTER (OUTPATIENT)
Dept: GENERAL RADIOLOGY | Age: 50
Discharge: HOME | End: 2025-04-03
Payer: COMMERCIAL

## 2025-04-01 ENCOUNTER — HOSPITAL ENCOUNTER (OUTPATIENT)
Age: 50
Discharge: HOME | End: 2025-04-03
Payer: COMMERCIAL

## 2025-04-01 DIAGNOSIS — M50.20 DISPLACEMENT OF CERVICAL INTERVERTEBRAL DISC: ICD-10-CM

## 2025-04-01 PROCEDURE — 72040 X-RAY EXAM NECK SPINE 2-3 VW: CPT

## 2025-04-02 ENCOUNTER — OFFICE VISIT (OUTPATIENT)
Dept: NEUROSURGERY | Age: 50
End: 2025-04-02

## 2025-04-02 VITALS
DIASTOLIC BLOOD PRESSURE: 74 MMHG | HEIGHT: 69 IN | HEART RATE: 80 BPM | OXYGEN SATURATION: 97 % | TEMPERATURE: 97.4 F | BODY MASS INDEX: 29.62 KG/M2 | SYSTOLIC BLOOD PRESSURE: 118 MMHG | WEIGHT: 200 LBS

## 2025-04-02 DIAGNOSIS — M50.20 DISPLACEMENT OF CERVICAL INTERVERTEBRAL DISC: Primary | ICD-10-CM

## 2025-04-02 PROCEDURE — 99024 POSTOP FOLLOW-UP VISIT: CPT | Performed by: PHYSICIAN ASSISTANT

## 2025-04-02 NOTE — PROGRESS NOTES
Post Operative Follow-up    Patient is status post: ACDF..  no arm pain.  Xenia op site pain ok.    Physical Exam  Alert and Oriented X 3  PERRLA, EOMI  WATTS 5/5  Wound: C/D/I    A/P: patient is s/p C4-6 ACDF one month ago.  X-rays stable.  Continue with restrictions and collar.  F/u in 2months with x-rays.

## 2025-08-02 ENCOUNTER — HOSPITAL ENCOUNTER (OUTPATIENT)
Age: 50
End: 2025-08-02
Payer: COMMERCIAL

## 2025-08-02 ENCOUNTER — HOSPITAL ENCOUNTER (OUTPATIENT)
Dept: GENERAL RADIOLOGY | Age: 50
End: 2025-08-02
Payer: COMMERCIAL

## 2025-08-02 DIAGNOSIS — M25.561 RIGHT KNEE PAIN, UNSPECIFIED CHRONICITY: ICD-10-CM

## 2025-08-02 DIAGNOSIS — G89.29 CHRONIC PAIN OF LEFT KNEE: ICD-10-CM

## 2025-08-02 DIAGNOSIS — M25.562 CHRONIC PAIN OF LEFT KNEE: ICD-10-CM

## 2025-08-02 PROCEDURE — 73560 X-RAY EXAM OF KNEE 1 OR 2: CPT

## (undated) DEVICE — DRAPE,REIN 53X77,STERILE: Brand: MEDLINE

## (undated) DEVICE — GLOVE ORANGE PI 8   MSG9080

## (undated) DEVICE — GLOVE SURG SZ 65 THK91MIL LTX FREE SYN POLYISOPRENE

## (undated) DEVICE — TOWEL,OR,DSP,ST,BLUE,DLX,10/PK,8PK/CS: Brand: MEDLINE

## (undated) DEVICE — SYRINGE MED 10ML LUERLOCK TIP W/O SFTY DISP

## (undated) DEVICE — LIQUIBAND RAPID ADHESIVE 36/CS 0.8ML: Brand: MEDLINE

## (undated) DEVICE — GLOVE SURG 8.5 PF POLYMER WHT STRL SIGN LTX ESSENTIAL LTX

## (undated) DEVICE — NEEDLE SPNL L3.5IN PNK HUB S STL REG WALL FIT STYL W/ QNCKE

## (undated) DEVICE — BANDAGE,GAUZE,4.5"X4.1YD,STERILE,LF: Brand: MEDLINE

## (undated) DEVICE — GOWN,SIRUS,FABRNF,L,20/CS: Brand: MEDLINE

## (undated) DEVICE — LUMBAR LAMINECTOMY: Brand: MEDLINE INDUSTRIES, INC.

## (undated) DEVICE — SKIN PREP TRAY 4 COMPARTM TRAY: Brand: MEDLINE INDUSTRIES, INC.

## (undated) DEVICE — DRILL BIT, 12MM

## (undated) DEVICE — TUBING SUCT 12FR MAL ALUM SHFT FN CAP VENT UNIV CONN W/ OBT

## (undated) DEVICE — ELECTRODE ES AD PED L2.5IN TEF INSUL MOD NONCORDED BLDE TIP

## (undated) DEVICE — 3.0MM PRECISION NEURO (MATCH HEAD)

## (undated) DEVICE — GLOVE SURG SZ 85 L12IN FNGR THK79MIL GRN LTX FREE

## (undated) DEVICE — TUBING, SUCTION, 3/16" X 12', STRAIGHT: Brand: MEDLINE

## (undated) DEVICE — CASPAR DISTR PIN12MMSTER: Brand: AESCULAP

## (undated) DEVICE — 3M™ IOBAN™ 2 ANTIMICROBIAL INCISE DRAPE 6640EZ: Brand: IOBAN™ 2

## (undated) DEVICE — BLADE CLIPPER GEN PURP NS

## (undated) DEVICE — 3M(TM) MEDIPORE(TM) +PAD SOFT CLOTH ADHESIVE WOUND DRESSING 3569: Brand: 3M™ MEDIPORE™

## (undated) DEVICE — SPONGE,DISSECTOR,K,XRAY,9/16"X1/4",STRL: Brand: MEDLINE

## (undated) DEVICE — ELECTRODE PT RET AD L9FT HI MOIST COND ADH HYDRGEL CORDED

## (undated) DEVICE — SPONGE,DISSECTOR,ROUND CHERRY,XR,ST,5/PK: Brand: MEDLINE

## (undated) DEVICE — NEEDLE HYPO 18GA L1.5IN PNK POLYPR HUB S STL REG BVL STR